# Patient Record
Sex: FEMALE | NOT HISPANIC OR LATINO | Employment: FULL TIME | URBAN - METROPOLITAN AREA
[De-identification: names, ages, dates, MRNs, and addresses within clinical notes are randomized per-mention and may not be internally consistent; named-entity substitution may affect disease eponyms.]

---

## 2022-03-05 ENCOUNTER — HOSPITAL ENCOUNTER (INPATIENT)
Facility: CLINIC | Age: 32
LOS: 3 days | Discharge: HOME HEALTH | End: 2022-03-08
Attending: OBSTETRICS & GYNECOLOGY | Admitting: OBSTETRICS & GYNECOLOGY
Payer: COMMERCIAL

## 2022-03-05 ENCOUNTER — ANESTHESIA EVENT (OUTPATIENT)
Dept: OBGYN | Facility: CLINIC | Age: 32
End: 2022-03-05
Payer: COMMERCIAL

## 2022-03-05 ENCOUNTER — ANESTHESIA (OUTPATIENT)
Dept: OBGYN | Facility: CLINIC | Age: 32
End: 2022-03-05
Payer: COMMERCIAL

## 2022-03-05 PROBLEM — Z36.89 ENCOUNTER FOR TRIAGE IN PREGNANT PATIENT: Status: ACTIVE | Noted: 2022-03-05

## 2022-03-05 LAB
ABO/RH(D): NORMAL
ANTIBODY SCREEN: NEGATIVE
BASOPHILS # BLD AUTO: 0 10E3/UL (ref 0–0.2)
BASOPHILS NFR BLD AUTO: 0 %
EOSINOPHIL # BLD AUTO: 0 10E3/UL (ref 0–0.7)
EOSINOPHIL NFR BLD AUTO: 0 %
ERYTHROCYTE [DISTWIDTH] IN BLOOD BY AUTOMATED COUNT: 13.9 % (ref 10–15)
HCT VFR BLD AUTO: 40.8 % (ref 35–47)
HGB BLD-MCNC: 13.5 G/DL (ref 11.7–15.7)
IMM GRANULOCYTES # BLD: 0.1 10E3/UL
IMM GRANULOCYTES NFR BLD: 1 %
LYMPHOCYTES # BLD AUTO: 2 10E3/UL (ref 0.8–5.3)
LYMPHOCYTES NFR BLD AUTO: 17 %
MCH RBC QN AUTO: 30.8 PG (ref 26.5–33)
MCHC RBC AUTO-ENTMCNC: 33.1 G/DL (ref 31.5–36.5)
MCV RBC AUTO: 93 FL (ref 78–100)
MONOCYTES # BLD AUTO: 0.6 10E3/UL (ref 0–1.3)
MONOCYTES NFR BLD AUTO: 5 %
NEUTROPHILS # BLD AUTO: 9.2 10E3/UL (ref 1.6–8.3)
NEUTROPHILS NFR BLD AUTO: 77 %
NRBC # BLD AUTO: 0 10E3/UL
NRBC BLD AUTO-RTO: 0 /100
PLATELET # BLD AUTO: 271 10E3/UL (ref 150–450)
RBC # BLD AUTO: 4.39 10E6/UL (ref 3.8–5.2)
SARS-COV-2 RNA RESP QL NAA+PROBE: NEGATIVE
SPECIMEN EXPIRATION DATE: NORMAL
WBC # BLD AUTO: 11.8 10E3/UL (ref 4–11)

## 2022-03-05 PROCEDURE — 250N000011 HC RX IP 250 OP 636: Performed by: ANESTHESIOLOGY

## 2022-03-05 PROCEDURE — 258N000003 HC RX IP 258 OP 636

## 2022-03-05 PROCEDURE — 250N000009 HC RX 250: Performed by: OBSTETRICS & GYNECOLOGY

## 2022-03-05 PROCEDURE — 250N000009 HC RX 250: Performed by: ANESTHESIOLOGY

## 2022-03-05 PROCEDURE — 87635 SARS-COV-2 COVID-19 AMP PRB: CPT | Performed by: OBSTETRICS & GYNECOLOGY

## 2022-03-05 PROCEDURE — 250N000011 HC RX IP 250 OP 636

## 2022-03-05 PROCEDURE — 86901 BLOOD TYPING SEROLOGIC RH(D): CPT | Performed by: OBSTETRICS & GYNECOLOGY

## 2022-03-05 PROCEDURE — 86780 TREPONEMA PALLIDUM: CPT | Performed by: OBSTETRICS & GYNECOLOGY

## 2022-03-05 PROCEDURE — 00HU33Z INSERTION OF INFUSION DEVICE INTO SPINAL CANAL, PERCUTANEOUS APPROACH: ICD-10-PCS | Performed by: ANESTHESIOLOGY

## 2022-03-05 PROCEDURE — 370N000003 HC ANESTHESIA WARD SERVICE

## 2022-03-05 PROCEDURE — 120N000001 HC R&B MED SURG/OB

## 2022-03-05 PROCEDURE — 258N000003 HC RX IP 258 OP 636: Performed by: OBSTETRICS & GYNECOLOGY

## 2022-03-05 PROCEDURE — 3E0R3BZ INTRODUCTION OF ANESTHETIC AGENT INTO SPINAL CANAL, PERCUTANEOUS APPROACH: ICD-10-PCS | Performed by: ANESTHESIOLOGY

## 2022-03-05 PROCEDURE — 85025 COMPLETE CBC W/AUTO DIFF WBC: CPT | Performed by: OBSTETRICS & GYNECOLOGY

## 2022-03-05 RX ORDER — LIDOCAINE 40 MG/G
CREAM TOPICAL
Status: DISCONTINUED | OUTPATIENT
Start: 2022-03-05 | End: 2022-03-06 | Stop reason: HOSPADM

## 2022-03-05 RX ORDER — PRENATAL VIT/IRON FUM/FOLIC AC 27MG-0.8MG
1 TABLET ORAL DAILY
COMMUNITY

## 2022-03-05 RX ORDER — CARBOPROST TROMETHAMINE 250 UG/ML
250 INJECTION, SOLUTION INTRAMUSCULAR
Status: DISCONTINUED | OUTPATIENT
Start: 2022-03-05 | End: 2022-03-06 | Stop reason: HOSPADM

## 2022-03-05 RX ORDER — METHYLERGONOVINE MALEATE 0.2 MG/ML
200 INJECTION INTRAVENOUS
Status: DISCONTINUED | OUTPATIENT
Start: 2022-03-05 | End: 2022-03-06 | Stop reason: HOSPADM

## 2022-03-05 RX ORDER — MISOPROSTOL 200 UG/1
800 TABLET ORAL
Status: DISCONTINUED | OUTPATIENT
Start: 2022-03-05 | End: 2022-03-06 | Stop reason: HOSPADM

## 2022-03-05 RX ORDER — NALOXONE HYDROCHLORIDE 0.4 MG/ML
0.4 INJECTION, SOLUTION INTRAMUSCULAR; INTRAVENOUS; SUBCUTANEOUS
Status: DISCONTINUED | OUTPATIENT
Start: 2022-03-05 | End: 2022-03-06 | Stop reason: HOSPADM

## 2022-03-05 RX ORDER — METOCLOPRAMIDE HYDROCHLORIDE 5 MG/ML
10 INJECTION INTRAMUSCULAR; INTRAVENOUS EVERY 6 HOURS PRN
Status: DISCONTINUED | OUTPATIENT
Start: 2022-03-05 | End: 2022-03-06 | Stop reason: HOSPADM

## 2022-03-05 RX ORDER — BUPIVACAINE HYDROCHLORIDE 2.5 MG/ML
INJECTION, SOLUTION EPIDURAL; INFILTRATION; INTRACAUDAL PRN
Status: DISCONTINUED | OUTPATIENT
Start: 2022-03-05 | End: 2022-03-06

## 2022-03-05 RX ORDER — MISOPROSTOL 200 UG/1
400 TABLET ORAL
Status: DISCONTINUED | OUTPATIENT
Start: 2022-03-05 | End: 2022-03-06 | Stop reason: HOSPADM

## 2022-03-05 RX ORDER — ACETAMINOPHEN 325 MG/1
650 TABLET ORAL EVERY 4 HOURS PRN
Status: DISCONTINUED | OUTPATIENT
Start: 2022-03-05 | End: 2022-03-06 | Stop reason: HOSPADM

## 2022-03-05 RX ORDER — FENTANYL CITRATE-0.9 % NACL/PF 10 MCG/ML
100 PLASTIC BAG, INJECTION (ML) INTRAVENOUS EVERY 5 MIN PRN
Status: DISCONTINUED | OUTPATIENT
Start: 2022-03-05 | End: 2022-03-06 | Stop reason: HOSPADM

## 2022-03-05 RX ORDER — FENTANYL CITRATE 50 UG/ML
50-100 INJECTION, SOLUTION INTRAMUSCULAR; INTRAVENOUS
Status: DISCONTINUED | OUTPATIENT
Start: 2022-03-05 | End: 2022-03-06 | Stop reason: HOSPADM

## 2022-03-05 RX ORDER — OXYTOCIN 10 [USP'U]/ML
10 INJECTION, SOLUTION INTRAMUSCULAR; INTRAVENOUS
Status: DISCONTINUED | OUTPATIENT
Start: 2022-03-05 | End: 2022-03-06 | Stop reason: HOSPADM

## 2022-03-05 RX ORDER — PROCHLORPERAZINE 25 MG
25 SUPPOSITORY, RECTAL RECTAL EVERY 12 HOURS PRN
Status: DISCONTINUED | OUTPATIENT
Start: 2022-03-05 | End: 2022-03-06 | Stop reason: HOSPADM

## 2022-03-05 RX ORDER — KETOROLAC TROMETHAMINE 30 MG/ML
30 INJECTION, SOLUTION INTRAMUSCULAR; INTRAVENOUS
Status: DISCONTINUED | OUTPATIENT
Start: 2022-03-05 | End: 2022-03-06

## 2022-03-05 RX ORDER — ONDANSETRON 2 MG/ML
4 INJECTION INTRAMUSCULAR; INTRAVENOUS EVERY 6 HOURS PRN
Status: DISCONTINUED | OUTPATIENT
Start: 2022-03-05 | End: 2022-03-06 | Stop reason: HOSPADM

## 2022-03-05 RX ORDER — ONDANSETRON 4 MG/1
4 TABLET, ORALLY DISINTEGRATING ORAL EVERY 6 HOURS PRN
Status: DISCONTINUED | OUTPATIENT
Start: 2022-03-05 | End: 2022-03-06 | Stop reason: HOSPADM

## 2022-03-05 RX ORDER — FENTANYL/BUPIVACAINE/NS/PF 2-1250MCG
PLASTIC BAG, INJECTION (ML) INJECTION
Status: COMPLETED
Start: 2022-03-05 | End: 2022-03-05

## 2022-03-05 RX ORDER — METOCLOPRAMIDE 10 MG/1
10 TABLET ORAL EVERY 6 HOURS PRN
Status: DISCONTINUED | OUTPATIENT
Start: 2022-03-05 | End: 2022-03-06 | Stop reason: HOSPADM

## 2022-03-05 RX ORDER — TRANEXAMIC ACID 10 MG/ML
1 INJECTION, SOLUTION INTRAVENOUS EVERY 30 MIN PRN
Status: DISCONTINUED | OUTPATIENT
Start: 2022-03-05 | End: 2022-03-06 | Stop reason: HOSPADM

## 2022-03-05 RX ORDER — IBUPROFEN 600 MG/1
600 TABLET, FILM COATED ORAL
Status: DISCONTINUED | OUTPATIENT
Start: 2022-03-05 | End: 2022-03-06

## 2022-03-05 RX ORDER — NALBUPHINE HYDROCHLORIDE 10 MG/ML
2.5-5 INJECTION, SOLUTION INTRAMUSCULAR; INTRAVENOUS; SUBCUTANEOUS EVERY 6 HOURS PRN
Status: DISCONTINUED | OUTPATIENT
Start: 2022-03-05 | End: 2022-03-06

## 2022-03-05 RX ORDER — LIDOCAINE 40 MG/G
CREAM TOPICAL
Status: DISCONTINUED | OUTPATIENT
Start: 2022-03-05 | End: 2022-03-05 | Stop reason: HOSPADM

## 2022-03-05 RX ORDER — SODIUM CHLORIDE, SODIUM LACTATE, POTASSIUM CHLORIDE, CALCIUM CHLORIDE 600; 310; 30; 20 MG/100ML; MG/100ML; MG/100ML; MG/100ML
INJECTION, SOLUTION INTRAVENOUS CONTINUOUS
Status: DISCONTINUED | OUTPATIENT
Start: 2022-03-05 | End: 2022-03-06 | Stop reason: HOSPADM

## 2022-03-05 RX ORDER — EPHEDRINE SULFATE 50 MG/ML
INJECTION, SOLUTION INTRAMUSCULAR; INTRAVENOUS; SUBCUTANEOUS
Status: DISCONTINUED
Start: 2022-03-05 | End: 2022-03-06 | Stop reason: HOSPADM

## 2022-03-05 RX ORDER — NALOXONE HYDROCHLORIDE 0.4 MG/ML
0.2 INJECTION, SOLUTION INTRAMUSCULAR; INTRAVENOUS; SUBCUTANEOUS
Status: DISCONTINUED | OUTPATIENT
Start: 2022-03-05 | End: 2022-03-06 | Stop reason: HOSPADM

## 2022-03-05 RX ORDER — OXYTOCIN 10 [USP'U]/ML
10 INJECTION, SOLUTION INTRAMUSCULAR; INTRAVENOUS
Status: DISCONTINUED | OUTPATIENT
Start: 2022-03-05 | End: 2022-03-06

## 2022-03-05 RX ORDER — OXYTOCIN/0.9 % SODIUM CHLORIDE 30/500 ML
340 PLASTIC BAG, INJECTION (ML) INTRAVENOUS CONTINUOUS PRN
Status: DISCONTINUED | OUTPATIENT
Start: 2022-03-05 | End: 2022-03-06 | Stop reason: HOSPADM

## 2022-03-05 RX ORDER — LIDOCAINE HYDROCHLORIDE AND EPINEPHRINE 15; 5 MG/ML; UG/ML
INJECTION, SOLUTION EPIDURAL PRN
Status: DISCONTINUED | OUTPATIENT
Start: 2022-03-05 | End: 2022-03-06

## 2022-03-05 RX ORDER — PROCHLORPERAZINE MALEATE 10 MG
10 TABLET ORAL EVERY 6 HOURS PRN
Status: DISCONTINUED | OUTPATIENT
Start: 2022-03-05 | End: 2022-03-06 | Stop reason: HOSPADM

## 2022-03-05 RX ORDER — OXYTOCIN/0.9 % SODIUM CHLORIDE 30/500 ML
100-340 PLASTIC BAG, INJECTION (ML) INTRAVENOUS CONTINUOUS PRN
Status: DISCONTINUED | OUTPATIENT
Start: 2022-03-05 | End: 2022-03-06

## 2022-03-05 RX ORDER — OXYTOCIN/0.9 % SODIUM CHLORIDE 30/500 ML
1-24 PLASTIC BAG, INJECTION (ML) INTRAVENOUS CONTINUOUS
Status: DISCONTINUED | OUTPATIENT
Start: 2022-03-05 | End: 2022-03-06 | Stop reason: HOSPADM

## 2022-03-05 RX ORDER — SODIUM CHLORIDE, SODIUM LACTATE, POTASSIUM CHLORIDE, CALCIUM CHLORIDE 600; 310; 30; 20 MG/100ML; MG/100ML; MG/100ML; MG/100ML
INJECTION, SOLUTION INTRAVENOUS CONTINUOUS PRN
Status: DISCONTINUED | OUTPATIENT
Start: 2022-03-05 | End: 2022-03-06 | Stop reason: HOSPADM

## 2022-03-05 RX ADMIN — BUPIVACAINE HYDROCHLORIDE 10 ML: 2.5 INJECTION, SOLUTION EPIDURAL; INFILTRATION; INTRACAUDAL at 23:20

## 2022-03-05 RX ADMIN — Medication: at 23:27

## 2022-03-05 RX ADMIN — SODIUM CHLORIDE, POTASSIUM CHLORIDE, SODIUM LACTATE AND CALCIUM CHLORIDE: 600; 310; 30; 20 INJECTION, SOLUTION INTRAVENOUS at 22:28

## 2022-03-05 RX ADMIN — LIDOCAINE HYDROCHLORIDE,EPINEPHRINE BITARTRATE 3 ML: 15; .005 INJECTION, SOLUTION EPIDURAL; INFILTRATION; INTRACAUDAL; PERINEURAL at 23:11

## 2022-03-05 RX ADMIN — LIDOCAINE HYDROCHLORIDE,EPINEPHRINE BITARTRATE 2 ML: 15; .005 INJECTION, SOLUTION EPIDURAL; INFILTRATION; INTRACAUDAL; PERINEURAL at 23:15

## 2022-03-05 RX ADMIN — Medication 2 MILLI-UNITS/MIN: at 23:27

## 2022-03-05 RX ADMIN — FENTANYL CITRATE: 0.05 INJECTION, SOLUTION INTRAMUSCULAR; INTRAVENOUS at 23:27

## 2022-03-05 ASSESSMENT — ACTIVITIES OF DAILY LIVING (ADL)
ADLS_ACUITY_SCORE: 4
ADLS_ACUITY_SCORE: 4

## 2022-03-06 LAB — T PALLIDUM AB SER QL: NONREACTIVE

## 2022-03-06 PROCEDURE — 250N000013 HC RX MED GY IP 250 OP 250 PS 637: Performed by: STUDENT IN AN ORGANIZED HEALTH CARE EDUCATION/TRAINING PROGRAM

## 2022-03-06 PROCEDURE — 120N000001 HC R&B MED SURG/OB

## 2022-03-06 PROCEDURE — 10907ZC DRAINAGE OF AMNIOTIC FLUID, THERAPEUTIC FROM PRODUCTS OF CONCEPTION, VIA NATURAL OR ARTIFICIAL OPENING: ICD-10-PCS | Performed by: OBSTETRICS & GYNECOLOGY

## 2022-03-06 PROCEDURE — 722N000001 HC LABOR CARE VAGINAL DELIVERY SINGLE

## 2022-03-06 PROCEDURE — 0KQM0ZZ REPAIR PERINEUM MUSCLE, OPEN APPROACH: ICD-10-PCS | Performed by: OBSTETRICS & GYNECOLOGY

## 2022-03-06 PROCEDURE — 258N000003 HC RX IP 258 OP 636: Performed by: OBSTETRICS & GYNECOLOGY

## 2022-03-06 PROCEDURE — G0463 HOSPITAL OUTPT CLINIC VISIT: HCPCS

## 2022-03-06 RX ORDER — METHYLERGONOVINE MALEATE 0.2 MG/ML
200 INJECTION INTRAVENOUS
Status: DISCONTINUED | OUTPATIENT
Start: 2022-03-06 | End: 2022-03-08 | Stop reason: HOSPADM

## 2022-03-06 RX ORDER — IBUPROFEN 800 MG/1
800 TABLET, FILM COATED ORAL EVERY 6 HOURS PRN
Status: DISCONTINUED | OUTPATIENT
Start: 2022-03-06 | End: 2022-03-08 | Stop reason: HOSPADM

## 2022-03-06 RX ORDER — DOCUSATE SODIUM 100 MG/1
100 CAPSULE, LIQUID FILLED ORAL DAILY
Status: DISCONTINUED | OUTPATIENT
Start: 2022-03-06 | End: 2022-03-08 | Stop reason: HOSPADM

## 2022-03-06 RX ORDER — OXYTOCIN/0.9 % SODIUM CHLORIDE 30/500 ML
340 PLASTIC BAG, INJECTION (ML) INTRAVENOUS CONTINUOUS PRN
Status: DISCONTINUED | OUTPATIENT
Start: 2022-03-06 | End: 2022-03-08 | Stop reason: HOSPADM

## 2022-03-06 RX ORDER — BISACODYL 10 MG
10 SUPPOSITORY, RECTAL RECTAL DAILY PRN
Status: DISCONTINUED | OUTPATIENT
Start: 2022-03-06 | End: 2022-03-08 | Stop reason: HOSPADM

## 2022-03-06 RX ORDER — MISOPROSTOL 200 UG/1
400 TABLET ORAL
Status: DISCONTINUED | OUTPATIENT
Start: 2022-03-06 | End: 2022-03-08 | Stop reason: HOSPADM

## 2022-03-06 RX ORDER — ACETAMINOPHEN 325 MG/1
650 TABLET ORAL EVERY 4 HOURS PRN
Status: DISCONTINUED | OUTPATIENT
Start: 2022-03-06 | End: 2022-03-08 | Stop reason: HOSPADM

## 2022-03-06 RX ORDER — HYDROCORTISONE 2.5 %
CREAM (GRAM) TOPICAL 3 TIMES DAILY PRN
Status: DISCONTINUED | OUTPATIENT
Start: 2022-03-06 | End: 2022-03-08 | Stop reason: HOSPADM

## 2022-03-06 RX ORDER — MODIFIED LANOLIN
OINTMENT (GRAM) TOPICAL
Status: DISCONTINUED | OUTPATIENT
Start: 2022-03-06 | End: 2022-03-08 | Stop reason: HOSPADM

## 2022-03-06 RX ORDER — OXYTOCIN 10 [USP'U]/ML
10 INJECTION, SOLUTION INTRAMUSCULAR; INTRAVENOUS
Status: DISCONTINUED | OUTPATIENT
Start: 2022-03-06 | End: 2022-03-08 | Stop reason: HOSPADM

## 2022-03-06 RX ORDER — CARBOPROST TROMETHAMINE 250 UG/ML
250 INJECTION, SOLUTION INTRAMUSCULAR
Status: DISCONTINUED | OUTPATIENT
Start: 2022-03-06 | End: 2022-03-08 | Stop reason: HOSPADM

## 2022-03-06 RX ORDER — MISOPROSTOL 200 UG/1
800 TABLET ORAL
Status: DISCONTINUED | OUTPATIENT
Start: 2022-03-06 | End: 2022-03-08 | Stop reason: HOSPADM

## 2022-03-06 RX ORDER — TRANEXAMIC ACID 10 MG/ML
1 INJECTION, SOLUTION INTRAVENOUS EVERY 30 MIN PRN
Status: DISCONTINUED | OUTPATIENT
Start: 2022-03-06 | End: 2022-03-08 | Stop reason: HOSPADM

## 2022-03-06 RX ADMIN — IBUPROFEN 800 MG: 800 TABLET ORAL at 08:07

## 2022-03-06 RX ADMIN — IBUPROFEN 800 MG: 800 TABLET ORAL at 14:19

## 2022-03-06 RX ADMIN — SODIUM CHLORIDE, POTASSIUM CHLORIDE, SODIUM LACTATE AND CALCIUM CHLORIDE: 600; 310; 30; 20 INJECTION, SOLUTION INTRAVENOUS at 05:05

## 2022-03-06 RX ADMIN — ACETAMINOPHEN 650 MG: 325 TABLET, FILM COATED ORAL at 11:24

## 2022-03-06 RX ADMIN — IBUPROFEN 800 MG: 800 TABLET ORAL at 20:02

## 2022-03-06 RX ADMIN — MISOPROSTOL 400 MCG: 200 TABLET ORAL at 07:27

## 2022-03-06 RX ADMIN — DOCUSATE SODIUM 100 MG: 100 CAPSULE, LIQUID FILLED ORAL at 11:24

## 2022-03-06 RX ADMIN — ACETAMINOPHEN 650 MG: 325 TABLET, FILM COATED ORAL at 16:00

## 2022-03-06 ASSESSMENT — ACTIVITIES OF DAILY LIVING (ADL)
ADLS_ACUITY_SCORE: 10
ADLS_ACUITY_SCORE: 8
ADLS_ACUITY_SCORE: 10
ADLS_ACUITY_SCORE: 4
ADLS_ACUITY_SCORE: 8
ADLS_ACUITY_SCORE: 4
ADLS_ACUITY_SCORE: 8
ADLS_ACUITY_SCORE: 10
ADLS_ACUITY_SCORE: 8
ADLS_ACUITY_SCORE: 8
ADLS_ACUITY_SCORE: 10
ADLS_ACUITY_SCORE: 8
ADLS_ACUITY_SCORE: 10
ADLS_ACUITY_SCORE: 8
ADLS_ACUITY_SCORE: 4
ADLS_ACUITY_SCORE: 10
ADLS_ACUITY_SCORE: 4
ADLS_ACUITY_SCORE: 8
ADLS_ACUITY_SCORE: 8
ADLS_ACUITY_SCORE: 4
ADLS_ACUITY_SCORE: 10
ADLS_ACUITY_SCORE: 10

## 2022-03-06 NOTE — PLAN OF CARE
Data: Conchis Metzger transferred to 430 via wheelchair at 0925. Baby transferred via parent's arms.  Patients mobililty level scored using the bedside mobility assistance tool (BMAT). Patient is at a mobility level test number: 4. Mobility equipment used: none required. Required assist of 1 staff members. Further use of BMAT scoring required.  Action: Receiving unit notified of transfer: Yes. Patient and family notified of room change. Report given to Joan at 0930. Belongings sent to receiving unit. Accompanied by Registered Nurse. Oriented patient to surroundings. Call light within reach. ID bands double-checked with receiving RN.  Response: Patient tolerated transfer and is stable.

## 2022-03-06 NOTE — PROVIDER NOTIFICATION
03/06/22 0305   Provider Notification   Provider Name/Title Dr. Vann    Method of Notification In Department   Request Evaluate - Remote   Notification Reason Variability Change;Bleeding     Fetal heart rate reviewed. See flow sheet for details. Bleeding discussed. No fresh blood currently on pad; however, has clot at the introitus. She will go to bedside shortly to assess and preform AROM if appropriate. Shireen Alvarez RN on 3/6/2022 at 3:54 AM

## 2022-03-06 NOTE — ANESTHESIA PROCEDURE NOTES
Epidural catheter Procedure Note    Pre-Procedure   Staff -        Anesthesiologist:  Pierce Bellamy MD       Performed By: anesthesiologist       Referred By: anabelle       Location: OB       Pre-Anesthestic Checklist: patient identified, IV checked, risks and benefits discussed, informed consent, monitors and equipment checked, pre-op evaluation and at physician/surgeon's request  Timeout:       Correct Patient: Yes        Correct Procedure: Yes        Correct Site: Yes        Correct Position: Yes   Procedure Documentation  Procedure: epidural catheter       Diagnosis: labor       Patient Position: sitting       Patient Prep/Sterile Barriers: sterile gloves, mask, patient draped       Skin prep: Betadine       Local skin infiltrated with 3 mL of 1% lidocaine.        Insertion Site: L3-4. (midline approach).       Technique: LORT saline        IDANIA at 6 cm.       Needle Type: [employer]y needle       Needle Gauge: 17.        Needle Length (Inches): 3.5        Catheter: 19 G.         Catheter threaded easily.         Threaded 11 cm at skin.         # of attempts: 1 and  # of redirects:  0    Assessment/Narrative         Paresthesias: No.      Test dose of 3 mL lidocaine 1.5% w/ 1:200,000 epinephrine at.         Test dose negative, 3 minutes after injection, for signs of intravascular, subdural, or intrathecal injection.       Insertion/Infusion Method: LORT saline       Aspiration negative for Heme or CSF via Epidural Catheter.     Comments:  Procedure tolerated well without apparent complications. Initial MDA bolus of 0.25% bupivacaine given. Epidural infusion (0.125% bupi with fentanyl 2mcg/ml) started at 10 ml/hr with PCEA of 5 ml q15min with a max cumulative dose of 25 ml/hr. PCEA instructions given and use encouraged PRN. Epidural expectations again reviewed and questions answered. Patient hemodynamically stable.  Patient and epidural functionality to be reassessed later via vital sign flowsheet, nursing  communication, and/or patient report.  Anesthesiologist immediately available for ongoing assessment and management.

## 2022-03-06 NOTE — PROVIDER NOTIFICATION
03/05/22 2101   Provider Notification   Provider Name/Title Dr. Vann    Method of Notification Phone   Request Evaluate - Remote   Notification Reason Labor Status     Unable to run ROM+ or FERN d/t blood. Perineum remains most, but there is no fluid on the pad. Frequency of contractions have increased. Cervix has slightly changed since arriving, but significantly changed since her office visit on 03/02/2022. Orders received to admit to labor and delivery. Telephone orders read back and verified. Will inform pt of plan of care. Shireen Alvarez RN on 3/5/2022 at 9:04 PM

## 2022-03-06 NOTE — PLAN OF CARE
Pt transferred to labor rm 409 via ambulation accompanied by spouse and RN. Pt tolerated the transfer well. Shireen Alvarez RN on 3/6/2022 at 1:30 AM

## 2022-03-06 NOTE — PROVIDER NOTIFICATION
03/06/22 0021   Provider Notification   Provider Name/Title Dr. Vann    Method of Notification Phone   Request Evaluate - Remote   Notification Reason Labor Status     Physician updated on maternal and fetal condition. Orders received to check cervix around 0130. Telephone orders read back and verified. Will inform pt of plan of care. Shireen Alvarez RN on 3/6/2022 at 12:32 AM

## 2022-03-06 NOTE — H&P
[facility] Labor and Delivery History and Physical    Conchis Metzger MRN# [id number]   Age: 31 year old YOB: 1990     Date of Admission:  3/5/2022         HPI:   Conchis Metzger is a 31 year old  at 39w2d by LMP c/w 11w1d US    admitted for contractions and questionable LOF.  The patient reports irregular painful contractions in the AM.  Around 1100, she went to the bathroom and she has some think mucous in the toilet.  She put on a thin pad and there was never anything on it until she used the bathroom, until 1600 when she felt more of a gush that soaked her pad.  Since that, more clear mucous discharge and around 1700 had onset of more painful contractions.     Admission SVE was 3-4 cm which was a change from the office on 3-2 when she was closed.  A recheck 1.5 hours later showed change to 4 cm and she was admitted for labor.  The patient reports Good fetal movement. Comfortable with epidural.           Pregnancy history:     OBSTETRIC HISTORY:  OB History    Para Term  AB Living   2 0 0 0 1 0   SAB IAB Ectopic Multiple Live Births   1 0 0 0 0      # Outcome Date GA Lbr James/2nd Weight Sex Delivery Anes PTL Lv   2 Current            1 SAB 2021 6w0d              EDC: Estimated Date of Delivery: Mar 11, 2022    Prenatal Labs:   Lab Results   Component Value Date    AS Negative 2022    HGB 13.5 2022           Maternal Past Medical History:     Past Medical History:   Diagnosis Date     Gastroesophageal reflux disease with esophagitis      Past Surgical History:   Procedure Laterality Date     DENTAL SURGERY      [place] teeth removed       Medications Prior to Admission   Medication Sig Dispense Refill Last Dose     Prenatal Vit-Fe Fumarate-FA (PRENATAL MULTIVITAMIN W/IRON) 27-0.8 MG tablet Take 1 tablet by mouth daily   3/5/2022 at 0800           Social History:     Social History     Tobacco Use     Smoking status: Never Smoker     Smokeless tobacco: Never  Used   Substance Use Topics     Alcohol use: Not Currently            Review of Systems:   The Review of Systems is negative other than noted in the HPI          Physical Exam:     Patient Vitals for the past 8 hrs:   BP Temp Temp src Resp   22 0205 -- 98.4  F (36.9  C) Oral --   22 0115 -- 98.4  F (36.9  C) Oral --   22 0000 -- 99  F (37.2  C) Oral --   22 2300 -- 98.4  F (36.9  C) Oral --   22 2220 -- 98.8  F (37.1  C) Oral --   22 1935 124/81 98  F (36.7  C) Oral 24     Gen: Pleasant, NAD   CV:  Regular rate and rhythm, no murmurs, rubs or gallops appreciated   Resp: Non-labored breathing.  Lungs clear to ausculation bilaterally   Abd: Soft, non-tender and non-distended   Ext: Trace pedal edema bilaterally     Cervix: 7.5/90%/-2 following rupture of forebag, clear.  Membranes: Assuming ROM at 1130 on 3/5 (fern and Rom Plus not sent per RN given amount of bloody discharge)  EFW: 7.5 lbs.  Presentation:Cephalic    Fetal Heart Rate Tracing:   Baseline 120  Variability: Moderate  Accelerations: Present  Decelerations: None  Interpretation: reactive    Contractions: q 2-3 min per EFM        Assessment:   Conchis Metzger is a 31 year old  at 39w2d admitted for PROM in latent labor.        Plan:     Labor:   - After minimal SVE change and concern for prolonged rupture, pitocin augmentation was started at 2327, currently at 3 mu/min. Continue to titrate as able.   Pain: Comfortable with epidural    FWB:   - Continous EFM   - Category I FHT, reactive    Prenatal Care:  -OB labs: B positive, Rubella immune, HIV neg, Heb B nonreactive, Heb RPR negative  -Genetics: NIPS normal, declines AFP   - Anatomy ultrasound normal, anterior placenta  - Rh positive, Rhogam not indicated  - , Hgb 11.8, plt 312 at 28 weeks  - S/p flu, Tdap and COVID and booster  - GBS neg  - Feed: Breast; has pump  - Contraception: OCP vs POP    Arianna Boo MD   Pager: [phone]   2022, 3:06  AM

## 2022-03-06 NOTE — PROVIDER NOTIFICATION
03/05/22 2236   Provider Notification   Provider Name/Title Dr. Vann    Method of Notification Phone   Request Evaluate - Remote   Notification Reason Labor Status     No significant change from earlier. If going by pt's report, water broke at 1100. Plan to start pitocin at 2300 or shortly after. Telephone orders read back and verified. Will inform pt of plan of care. Shireen Alvarez RN on 3/5/2022 at 10:38 PM

## 2022-03-06 NOTE — PROVIDER NOTIFICATION
03/06/22 0223   Provider Notification   Provider Name/Title Dr. Vann    Method of Notification Phone   Request Evaluate - Remote   Notification Reason Labor Status     Physician updated on maternal and fetal condition. Pt has more bleeding than expected and a bulging bag of fluid. Fetal heart rate is currently within normal limits with moderate variability and accelerations. Physician will come to beside around 0300 to evaluate in person and preform AROM if appropriate. Will inform pt of plan of care. Shireen Alvarez RN on 3/6/2022 at 3:45 AM

## 2022-03-06 NOTE — L&D DELIVERY NOTE
Delivery Summary    Conchis Metzger MRN# [id number]   Age: 31 year old YOB: 1990     Conchis Metzger is a 31 year old  at 39w2d by LMP c/w 11w1d US who was admitted for latent labor and possible SROM on 3/5/2022. Pregnancy was uncomplicated. Upon admission her cervix was 3.5/90/-2. She received an epidural with good pain relief. AROM was performed at 0320 with clear fluid. Pitocin was started for augmentation. She was complete at 0545 and began pushing at 0615. At 0706 on 3/6/22 a vigorous male infant was delivered in the JOSÉ position with apgars of 9 and 9. Weight 3450g. Delayed cord clamping was done for >60 seconds. The placenta delivered spontaneously with manual assistance distal to the cervix,  Inspection showed it was intact with a 3 vessel cord. Pitocin was given after delivery of the placenta followed by a large gush of bleeding.  At that time, fundal massage and buccal misoprostol was given.  A 2nd degree perineal laceration was present and was repaired in the standard fashion with 3-0 Vicryl. EBL of 850mL. Sponge and sharp counts were correct. At this time, Dr. Blackwell took over cares and to monitoring bleeding closely.       Shira Metzger-Conchis [[id number]]    Labor Event Times    Labor onset date: 3/5/22 Onset time:  9:00 PM   Dilation complete date: 3/6/22 Complete time:  5:45 AM   Start pushing date/time: 3/6/2022 0615      Labor Events     labor?: No   steroids: None  Labor Type: Spontaneous, Augmentation  Predominate monitoring during 1st stage: continuous electronic fetal monitoring     Antibiotics received during labor?: No     Rupture identifier: Sac 1  Rupture date/time: 3/5/22 1600   Rupture type: Spontaneous rupture of membranes occuring during spontaneous labor or augmentation  Fluid color: Clear  Fluid odor: Normal     Augmentation: Oxytocin  Indications for augmentation: Ineffective Contraction Pattern  1:1 continuous labor support provided by?: RN  Labor partogram used?: no      Delivery/Placenta Date and Time    Delivery Date: 3/6/22 Delivery Time:  7:06 AM   Placenta Date/Time: 3/6/2022  7:25 AM  Oxytocin given at the time of delivery: after delivery of baby  Delivering clinician: Arianna Vann MD   Other personnel present at delivery:  Provider Role   Shireen Alvarez RN Delivery Nurse   Arianna Vann MD Obstetrician         Vaginal Counts     Initial count performed by 2 team members:  Two Team Members   Shireen GARCIA, GARY Vann        Needles Suture Needles Sponges (RETIRED) Instruments   Initial counts 2  5    Added to count  1     Relief counts       Final counts 2 1 5          Placed during labor Accounted for at the end of labor   FSE No NA   IUPC No NA   Cervadil No NA              Final count performed by 2 team members:  Two Team Members   Dr Rishi Deleon RN      Final count correct?: Yes     Apgars    Living status: Living   1 Minute 5 Minute 10 Minute 15 Minute 20 Minute   Skin color: 1  1       Heart rate: 2  2       Reflex irritability: 2  2       Muscle tone: 2  2       Respiratory effort: 2  2       Total: 9  9       Apgars assigned by: SHIREEN VEGA     Cord    Vessels: 3 Vessels    Cord Complications: Nuchal   Nuchal Intervention: delivered through         Nuchal cord description: loose nuchal cord         Cord Blood Disposition: Lab    Gases Sent?: No    Delayed cord clamping?: Yes    Cord Clamping Delay (seconds): 31-60 seconds    Stem cell collection?: No        Resuscitation    Methods: None      Measurements    Weight: 7 lb 9.7 oz       Labor Events and Shoulder Dystocia    Fetal Tracing Prior to Delivery: Category 2     Delivery (Maternal) (Provider to Complete) ([id number])    Episiotomy: None  Perineal lacerations: 2nd Repaired?: Yes   Labial laceration: bilateral Repaired?: Yes   Repair suture: 3-0 Vicryl  Genital tract inspection done: Pos     Blood Loss  Mother: Nancy Metzgerherb Newman #[id number]    Start of Mother's Information    Delivery Blood Loss  03/05/22 2100 - 03/06/22 0733    None           End of Mother's Information  Mother: Conchis Metzger #[id number]          Delivery - Provider to Complete ([id number])    Delivering clinician: Arianna Vann MD  Attempted Delivery Types (Choose all that apply): Spontaneous Vaginal Delivery  Delivery Type (Choose the 1 that will go to the Birth History): Vaginal, Spontaneous                   Other personnel:  Provider Role   Shireen Alvarez, RN Delivery Nurse   Arianna Vann MD Obstetrician                Placenta    Date/Time: 3/6/2022  7:25 AM  Removal: Expressed, Manual Removal  Disposition: Hospital disposal           Anesthesia    Method: Epidural  Cervical dilation at placement: 4-7     Analgesic:  BIRTH HISTORY: ANALGESIC   FENT 2 MCG/ML-BUPIV 0.125% (USE BUPIV 0.5%) IN  ML CNR-HI             Presentation and Position    Presentation: Vertex    Position: Left Occiput Anterior                 Arianna Vann MD

## 2022-03-06 NOTE — ANESTHESIA PREPROCEDURE EVALUATION
Anesthesia Pre-Procedure Evaluation    Patient: Conchis Metzger   MRN: [id number] : 1990        Procedure : * No procedures listed *          History reviewed. No pertinent past medical history.   Past Surgical History:   Procedure Laterality Date     DENTAL SURGERY      [place] teeth removed       No Known Allergies   Social History     Tobacco Use     Smoking status: Never Smoker     Smokeless tobacco: Never Used   Substance Use Topics     Alcohol use: Not Currently      Wt Readings from Last 1 Encounters:   No data found for Wt        Anesthesia Evaluation            ROS/MED HX  ENT/Pulmonary:  - neg pulmonary ROS     Neurologic:  - neg neurologic ROS     Cardiovascular:  - neg cardiovascular ROS     METS/Exercise Tolerance:     Hematologic:  - neg hematologic  ROS     Musculoskeletal:  - neg musculoskeletal ROS     GI/Hepatic:  - neg GI/hepatic ROS     Renal/Genitourinary:  - neg Renal ROS     Endo:  - neg endo ROS     Psychiatric/Substance Use:  - neg psychiatric ROS     Infectious Disease:  - neg infectious disease ROS     Malignancy:       Other:            Physical Exam    Airway        Mallampati: II   TM distance: > 3 FB   Neck ROM: full   Mouth opening: > 3 cm    Respiratory Devices and Support         Dental  no notable dental history         Cardiovascular          Rhythm and rate: regular and normal     Pulmonary   pulmonary exam normal                OUTSIDE LABS:  CBC:   Lab Results   Component Value Date    WBC 11.8 (H) 2022    HGB 13.5 2022    HCT 40.8 2022     2022     BMP: No results found for: NA, POTASSIUM, CHLORIDE, CO2, BUN, CR, GLC  COAGS: No results found for: PTT, INR, FIBR  POC: No results found for: BGM, HCG, HCGS  HEPATIC: No results found for: ALBUMIN, PROTTOTAL, ALT, AST, GGT, ALKPHOS, BILITOTAL, BILIDIRECT, ALEXIS  OTHER: No results found for: PH, LACT, A1C, RAÚL, PHOS, MAG, LIPASE, AMYLASE, TSH, T4, T3, CRP, SED    Anesthesia Plan    ASA Status:   2   - Procedure: Procedure only, no anesthetic delivered      Anesthesia Type: Epidural.              Consents    Anesthesia Plan(s) and associated risks, benefits, and realistic alternatives discussed. Questions answered and patient/representative(s) expressed understanding.    - Discussed:     - Discussed with:  Patient         Postoperative Care            Comments:    Other Comments: Continuous Labor Epidural: Indication is for labor pain. Following an discussion of the procedure, epidural expectations, and risks and benefits (risks including, but not limited to, nerve damage, infection, bleeding/hematoma, inadvertent dural puncture, spinal headache, partial or failed block), the patient appears to understand and consents to proceed. Questions were encouraged and answered.             Pierce Bellamy MD

## 2022-03-06 NOTE — CARE PLAN
"Data: Patient presented to [facility]: 3/5/2022  7:19 PM. Reason for maternal/fetal assessment is uterine contractions, leaking vaginal fluid. Patient reports she first noticed contractions at 0700. First noticed a \"glop\" of clear mucous at 1100. The contractions continued to get stronger and closer together throughout the day. The clear mucous continued to leak throughout the day too; however, didn't notice a lot on her pad. Patient is a .  Prenatal record reviewed. Pregnancy has been uncomplicated..  Gestational Age 39w1d. VSS. Fetal movement active. Patient denies abdominal pain, pelvic pressure, nausea, vomiting, headache, visual disturbances, epigastric or URQ pain, significant edema. Support person is present.   Action: Verbal consent for EFM. Triage assessment completed. Bill of rights reviewed.  Response: Patient verbalized agreement with plan. Will contact Dr Arianna Vann with update and for further orders. Shireen Alvarez RN on 3/5/2022 at 8:39 PM    "

## 2022-03-06 NOTE — PROVIDER NOTIFICATION
03/05/22 2000   Provider Notification   Provider Name/Title Dr. Vann    Method of Notification In Department   Request Evaluate-Remote   Notification Reason Other  (Pt arrived )     Physician informed of pt's arrival and current maternal and fetal condition. Orders received to collect a ROM+ and FERN. Verbal orders read back and verified. Will inform pt of plan of care. Shireen Alvarez RN on 3/5/2022 at 8:42 PM

## 2022-03-06 NOTE — PROVIDER NOTIFICATION
03/06/22 0300   Provider Notification   Provider Name/Title Dr. Vann    Method of Notification Phone   Request Evaluate in Person   Notification Reason Variability Change;Bleeding     Physician requested to bedside to evaluate fetal heart rate, SVE, bleeding, and to preform AROM if appropriate. She will be up shortly. Shireen Alvarez RN on 3/6/2022 at 3:36 AM

## 2022-03-07 LAB — HGB BLD-MCNC: 10.4 G/DL (ref 11.7–15.7)

## 2022-03-07 PROCEDURE — 999N000081 HC STATISTIC IP LACTATION SERVICES 31-45 MIN

## 2022-03-07 PROCEDURE — 36415 COLL VENOUS BLD VENIPUNCTURE: CPT | Performed by: STUDENT IN AN ORGANIZED HEALTH CARE EDUCATION/TRAINING PROGRAM

## 2022-03-07 PROCEDURE — 85018 HEMOGLOBIN: CPT | Performed by: STUDENT IN AN ORGANIZED HEALTH CARE EDUCATION/TRAINING PROGRAM

## 2022-03-07 PROCEDURE — 250N000013 HC RX MED GY IP 250 OP 250 PS 637: Performed by: OBSTETRICS & GYNECOLOGY

## 2022-03-07 PROCEDURE — 250N000013 HC RX MED GY IP 250 OP 250 PS 637: Performed by: STUDENT IN AN ORGANIZED HEALTH CARE EDUCATION/TRAINING PROGRAM

## 2022-03-07 PROCEDURE — 120N000001 HC R&B MED SURG/OB

## 2022-03-07 RX ORDER — IBUPROFEN 800 MG/1
800 TABLET, FILM COATED ORAL EVERY 6 HOURS PRN
Status: ON HOLD | COMMUNITY
Start: 2022-03-07 | End: 2024-07-26

## 2022-03-07 RX ORDER — PRENATAL VIT/IRON FUM/FOLIC AC 27MG-0.8MG
1 TABLET ORAL DAILY
Status: DISCONTINUED | OUTPATIENT
Start: 2022-03-07 | End: 2022-03-08 | Stop reason: HOSPADM

## 2022-03-07 RX ORDER — ACETAMINOPHEN 325 MG/1
650 TABLET ORAL EVERY 4 HOURS PRN
Status: ON HOLD | COMMUNITY
Start: 2022-03-07 | End: 2024-07-27

## 2022-03-07 RX ADMIN — IBUPROFEN 800 MG: 800 TABLET ORAL at 13:00

## 2022-03-07 RX ADMIN — IBUPROFEN 800 MG: 800 TABLET ORAL at 19:33

## 2022-03-07 RX ADMIN — DOCUSATE SODIUM 100 MG: 100 CAPSULE, LIQUID FILLED ORAL at 08:34

## 2022-03-07 RX ADMIN — ACETAMINOPHEN 650 MG: 325 TABLET, FILM COATED ORAL at 17:22

## 2022-03-07 RX ADMIN — ACETAMINOPHEN 650 MG: 325 TABLET, FILM COATED ORAL at 00:34

## 2022-03-07 RX ADMIN — PRENATAL VITAMINS-IRON FUMARATE 27 MG IRON-FOLIC ACID 0.8 MG TABLET 1 TABLET: at 11:42

## 2022-03-07 RX ADMIN — IBUPROFEN 800 MG: 800 TABLET ORAL at 02:07

## 2022-03-07 RX ADMIN — ACETAMINOPHEN 650 MG: 325 TABLET, FILM COATED ORAL at 08:34

## 2022-03-07 ASSESSMENT — ACTIVITIES OF DAILY LIVING (ADL)
ADLS_ACUITY_SCORE: 8

## 2022-03-07 NOTE — PROGRESS NOTES
Has been independent with breast feeding. Pain expressed when infant latched on. She is not using the nipple shield. Had a golf ball sized clot. FF@U. Uterine cramping rated 3 and feels Tylenol and Motrin giving adequate relief. Declined cold pack to Laceration and prefers Tucks. Voiding without difficulty. at bedside and is a good support. Encourage ambulation. Encourage PO fluids. Monitor Lochia. Medicate for pain.

## 2022-03-07 NOTE — PLAN OF CARE
Vital signs stable. Postpartum assessment WDL. Pain well controlled. Up ad/rebecca. Voiding w/o difficulty. Breastfeeding with a nipple shield. Pt reports her nipples feel tender especially when breastfeeding. Mother Love nipple cream, given. Bonding well with . Will continue to monitor. Questions/concerns addressed.

## 2022-03-07 NOTE — DISCHARGE SUMMARY
[facility]    Discharge Summary  Obstetrics    Date of Admission:  3/5/2022  Date of Discharge:  3/8/2022  Discharging Provider: Dr. Jovan Diaz  [phone]       Discharge Diagnoses   Patient Active Problem List   Diagnosis     Encounter for triage in pregnant patient     Labor and delivery indication for care or intervention     Vaginal delivery     History of Present Illness   Conchis Metzger is a 31 year old female now  who presented to L&D @ 39w2d contractions and possible SROM. Her pregnancy has been uncomplicated. Please see her admit H&P for full details of her PMH, PSH, Meds, Allergies and exam on admit.    Hospital Course   The patient had a Normal spontaneous vaginal delivery @ 39w2d with  (s/p pitocin and Cytotec) please see her delivery summary for full details.     Her postpartum course was uncomplicated. On postpartum day 2, she was meeting all of her postpartum goals and deemed stable for discharge. She was voiding without difficulty, tolerating a regular diet without nausea and vomiting, her pain was well controlled on oral pain medicines and her lochia was appropriate.    Hgb:   Lab Results   Component Value Date    HGB 10.4 2022    HGB 13.5 2022       No results found for: RH and rhogam was not given    Contraception was discussed and will be addressed at her postpartum appointment.    Instructions:  1) Call for temperature greater than 100.4F, foul smelling vaginal discharge, bleeding more than 1 pad per hour for 2 hrs, pain not controlled by oral pain meds, severe constipation or severe nausea or vomiting.  2) She was instructed to follow-up with her primary OB in 6 weeks for a routine postpartum visit  3) She was instructed to continue her PNV on discharge if she wished to breast feed her infant.    Dr. Jovan Diaz  [phone]     Discharge Disposition   Discharged to home   Condition at discharge: Stable    Primary Care Physician    Physician No Ref-Primary    Consultations This Hospital Stay   ANESTHESIOLOGY IP CONSULT  HOME CARE POST PARTUM/ IP CONSULT  LACTATION IP CONSULT    Discharge Orders      Postpartum Home Care Referral      Activity    Activity as tolerated     Reason for your hospital stay    Maternity care     Follow Up    Follow up with provider in 6 weeks for post-delivery check     Breast pump - Manual/Electric    Breast Pump Documentation:  Manual/Electric Pump: To support adequate breast milk production and nutrition for infant.     I, the undersigned, certify that the above prescribed supplies are medically necessary for this patient and is both reasonable and necessary in reference to accepted standards of medical and necessary in reference to accepted standards of medical practice in the treatment of this patient's condition and is not prescribed as a convenience.     Diet    Resume previous diet     Discharge Medications   Current Discharge Medication List      START taking these medications    Details   acetaminophen (TYLENOL) 325 MG tablet Take 2 tablets (650 mg) by mouth every 4 hours as needed for mild pain or fever (greater than or equal to 38  C /100.4  F (oral) or 38.5  C/ 101.4  F (core).)    Associated Diagnoses:  (spontaneous vaginal delivery)      ibuprofen (ADVIL/MOTRIN) 800 MG tablet Take 1 tablet (800 mg) by mouth every 6 hours as needed for other (cramping)    Associated Diagnoses:  (spontaneous vaginal delivery)         CONTINUE these medications which have NOT CHANGED    Details   Prenatal Vit-Fe Fumarate-FA (PRENATAL MULTIVITAMIN W/IRON) 27-0.8 MG tablet Take 1 tablet by mouth daily           Allergies   No Known Allergies

## 2022-03-07 NOTE — PLAN OF CARE
Pt is up ad rebecca, and reports adequate pain control. Family is present and supportive. Pt is attentive to infants needs. Meeting expected goals.

## 2022-03-07 NOTE — PROGRESS NOTES
[facility] Postpartum Note    S:  Conchis Metzger feels sore this morning. Was able to sleep last night. Pain control adequate. Lochia minimal. Voiding. Breast feeding. Mood Good.     O:  Vitals were reviewed  Blood pressure 117/75, pulse 74, temperature 98  F (36.7  C), temperature source Oral, resp. rate 16, SpO2 95 %, unknown if currently breastfeeding.      General: healthy, alert and no distress  Abd: soft, appropriately tender, fundus firm  Legs: Non-tender, 0+ pitting edema    Hemoglobin   Date Value Ref Range Status   03/07/2022 10.4 (L) 11.7 - 15.7 g/dL Final   03/05/2022 13.5 11.7 - 15.7 g/dL Final       Assessment and Plan:   Postpartum Day #1, status post vaginal delivery, doing well.  -- Routine care  --: s/p pitocin and Cytotec. hgb 13.5 -> 10.4   -- Discharge meds: OTC tylenol and ibuprofen   -- Contraception: pills    Mandie Perry, , DO

## 2022-03-07 NOTE — PLAN OF CARE
VSS, taking ibuprofen and tylenol for pain, breast feeding independently, seen by lactation RN this shift; completed discharge paperwork; will be discharging tomorrow.

## 2022-03-08 VITALS
HEART RATE: 77 BPM | RESPIRATION RATE: 16 BRPM | SYSTOLIC BLOOD PRESSURE: 129 MMHG | OXYGEN SATURATION: 95 % | DIASTOLIC BLOOD PRESSURE: 77 MMHG | TEMPERATURE: 99.5 F

## 2022-03-08 PROCEDURE — 250N000013 HC RX MED GY IP 250 OP 250 PS 637: Performed by: OBSTETRICS & GYNECOLOGY

## 2022-03-08 PROCEDURE — 250N000013 HC RX MED GY IP 250 OP 250 PS 637: Performed by: STUDENT IN AN ORGANIZED HEALTH CARE EDUCATION/TRAINING PROGRAM

## 2022-03-08 RX ADMIN — IBUPROFEN 800 MG: 800 TABLET ORAL at 08:16

## 2022-03-08 RX ADMIN — PRENATAL VITAMINS-IRON FUMARATE 27 MG IRON-FOLIC ACID 0.8 MG TABLET 1 TABLET: at 08:16

## 2022-03-08 RX ADMIN — DOCUSATE SODIUM 100 MG: 100 CAPSULE, LIQUID FILLED ORAL at 08:16

## 2022-03-08 ASSESSMENT — ACTIVITIES OF DAILY LIVING (ADL)
ADLS_ACUITY_SCORE: 8

## 2022-03-08 NOTE — PLAN OF CARE
VSS, on pathway, using gel pads and mother love cream for sore nipples; discharge education completed and follow up discussed with pt in 6 weeks for OB PP visit with MD; no further questions, discharging with baby this afternoon.

## 2022-03-08 NOTE — PLAN OF CARE
Goals met: vitally stable, fundal checks/lochia, voiding, pain control (did not take medications overnight), tolerating diet, ambulating independently, breastfeeding Q2-3hr (was cluster feeding in evening), bonding/cares for baby    Work in progress: laceration pain (has tucks), sore nipples (has motherlove/hydrogels, is no longer using nipple shield), interested in discharge today

## 2022-03-08 NOTE — LACTATION NOTE
Lactation visit. Assisted with feeds this shift. Baby has been sleepy. Jaundice high intermediate and baby had circumcision procedure this morning. Patient has been using a shield prn. Educated on sleepiness with  jaundice and circ. Baby latched with shield, writer compressed EBM. Baby needing stimulation to keep nursing. Switched from football hold to cross cradle for a better latch. Second feed, baby mor interested latch right away without shield. Encouraged breast compressions. Conchis was independent with latch. Basic breastfeeding education reviewed, knows to breastfeed every 2-3 hours. Started patient pumping to help bring milk in. Knows to call for assistance prn

## 2022-03-08 NOTE — PROGRESS NOTES
[facility]  PPD# 2    Pt doing well, states she would like to go home today. Ambulating, voiding, tolerating PO. Decreased lochia. Pain controlled. Breast feeding and coping well with infant.    Vitals:    22 2300 22 0800 22 1721 22 0136   BP: 117/72 117/75 118/75 112/63   BP Location: Left arm Left arm  Left arm   Patient Position: Left side Sitting  Semi-Courtney's   Cuff Size: Adult Regular   Adult Regular   Pulse: 85 74 72 68   Resp: 18 16 18 18   Temp: 98.1  F (36.7  C) 98  F (36.7  C) 98.8  F (37.1  C) 98.3  F (36.8  C)   TempSrc: Oral Oral Oral Oral   SpO2:         Abd soft, appropriately tender, nondistended, FFBU, no fundal tenderness  Ext 1+ edema bilat LE, no CT BL    Lab Results   Component Value Date    HGB 10.4 2022       A/P 31 year old  at 39w2d s/p  PPD# 2..    -Hemodynamically stable    - : s/p pitocin and Cytotec. hgb 13.5 -> 10.4    - ABLA- asymptomatic.    - Continue with prenatal vitamins, iron rich diet, vit C  -Rh pos  -Diet; regular  -pain Tylenol and Motrin  -Bowel ppx- Senna/docusate/simethicone  -Rubella immune  -Tdap given prentally  -Breast feeding, encouraged. Continue PNV's. Proper hydration and caloric intake counseled.  -BC; pills    Plan: Discharge home, 6 wk PP visit. Continue PNVs.    Dr. Jovan Diaz  [phone]  3/8/2022 7:16 AM

## 2022-03-08 NOTE — PLAN OF CARE
Data: Vital signs within normal limits. Postpartum checks within normal limits - see flow record. Patient eating and drinking normally. Patient able to empty bladder independently and is up ambulating. Patient performing self cares, is able to care for infant and is breastfeeding every 2-3 hours. Lactation visit this evening, encouraged pumping after each feed.   Perineum  has some swelling noted. Is using tucks pads for discomfort.    Action: Patient medicated during the shift for pain with tylenol and ibuprofen. See MAR. Patient education done, see flow record.  Response: Positive attachment behaviors observed with infant. Patient's spouse and MIL present this shift.   Plan: Continue current plan of care.  Anticipate discharge on 3/8.

## 2024-07-26 ENCOUNTER — HOSPITAL ENCOUNTER (INPATIENT)
Facility: CLINIC | Age: 34
LOS: 1 days | Discharge: HOME HEALTH | End: 2024-07-28
Attending: OBSTETRICS & GYNECOLOGY | Admitting: OBSTETRICS & GYNECOLOGY
Payer: COMMERCIAL

## 2024-07-26 PROCEDURE — G0463 HOSPITAL OUTPT CLINIC VISIT: HCPCS

## 2024-07-26 RX ORDER — CALCIUM CARBONATE 500 MG/1
2 TABLET, CHEWABLE ORAL 2 TIMES DAILY
COMMUNITY

## 2024-07-26 ASSESSMENT — ACTIVITIES OF DAILY LIVING (ADL): ADLS_ACUITY_SCORE: 18

## 2024-07-27 ENCOUNTER — ANESTHESIA (OUTPATIENT)
Dept: OBGYN | Facility: CLINIC | Age: 34
End: 2024-07-27
Payer: COMMERCIAL

## 2024-07-27 ENCOUNTER — ANESTHESIA EVENT (OUTPATIENT)
Dept: OBGYN | Facility: CLINIC | Age: 34
End: 2024-07-27
Payer: COMMERCIAL

## 2024-07-27 LAB
ABO/RH(D): NORMAL
ALBUMIN MFR UR ELPH: 92.2 MG/DL
ALT SERPL W P-5'-P-CCNC: 12 U/L (ref 0–50)
ANTIBODY SCREEN: NEGATIVE
AST SERPL W P-5'-P-CCNC: 27 U/L (ref 0–45)
CREAT SERPL-MCNC: 0.68 MG/DL (ref 0.51–0.95)
CREAT UR-MCNC: 85.8 MG/DL
EGFRCR SERPLBLD CKD-EPI 2021: >90 ML/MIN/1.73M2
ERYTHROCYTE [DISTWIDTH] IN BLOOD BY AUTOMATED COUNT: 14.5 % (ref 10–15)
HCT VFR BLD AUTO: 38.5 % (ref 35–47)
HGB BLD-MCNC: 11.8 G/DL (ref 11.7–15.7)
HGB BLD-MCNC: 12.7 G/DL (ref 11.7–15.7)
MCH RBC QN AUTO: 28.7 PG (ref 26.5–33)
MCHC RBC AUTO-ENTMCNC: 33 G/DL (ref 31.5–36.5)
MCV RBC AUTO: 87 FL (ref 78–100)
PLATELET # BLD AUTO: 256 10E3/UL (ref 150–450)
PROT/CREAT 24H UR: 1.07 MG/MG CR (ref 0–0.2)
RBC # BLD AUTO: 4.42 10E6/UL (ref 3.8–5.2)
SPECIMEN EXPIRATION DATE: NORMAL
T PALLIDUM AB SER QL: NONREACTIVE
WBC # BLD AUTO: 12 10E3/UL (ref 4–11)

## 2024-07-27 PROCEDURE — 250N000011 HC RX IP 250 OP 636: Performed by: OBSTETRICS & GYNECOLOGY

## 2024-07-27 PROCEDURE — 722N000001 HC LABOR CARE VAGINAL DELIVERY SINGLE

## 2024-07-27 PROCEDURE — 86900 BLOOD TYPING SEROLOGIC ABO: CPT | Performed by: OBSTETRICS & GYNECOLOGY

## 2024-07-27 PROCEDURE — 370N000003 HC ANESTHESIA WARD SERVICE: Performed by: ANESTHESIOLOGY

## 2024-07-27 PROCEDURE — 85018 HEMOGLOBIN: CPT | Performed by: OBSTETRICS & GYNECOLOGY

## 2024-07-27 PROCEDURE — 84450 TRANSFERASE (AST) (SGOT): CPT | Performed by: OBSTETRICS & GYNECOLOGY

## 2024-07-27 PROCEDURE — 82565 ASSAY OF CREATININE: CPT | Performed by: OBSTETRICS & GYNECOLOGY

## 2024-07-27 PROCEDURE — 250N000009 HC RX 250: Performed by: OBSTETRICS & GYNECOLOGY

## 2024-07-27 PROCEDURE — 36415 COLL VENOUS BLD VENIPUNCTURE: CPT | Performed by: OBSTETRICS & GYNECOLOGY

## 2024-07-27 PROCEDURE — 999N000079 HC STATISTIC IP LACTATION SERVICES 1-15 MIN

## 2024-07-27 PROCEDURE — 250N000013 HC RX MED GY IP 250 OP 250 PS 637: Performed by: OBSTETRICS & GYNECOLOGY

## 2024-07-27 PROCEDURE — 258N000003 HC RX IP 258 OP 636: Performed by: OBSTETRICS & GYNECOLOGY

## 2024-07-27 PROCEDURE — 85027 COMPLETE CBC AUTOMATED: CPT | Performed by: OBSTETRICS & GYNECOLOGY

## 2024-07-27 PROCEDURE — 84460 ALANINE AMINO (ALT) (SGPT): CPT | Performed by: OBSTETRICS & GYNECOLOGY

## 2024-07-27 PROCEDURE — 120N000001 HC R&B MED SURG/OB

## 2024-07-27 PROCEDURE — 84156 ASSAY OF PROTEIN URINE: CPT | Performed by: OBSTETRICS & GYNECOLOGY

## 2024-07-27 PROCEDURE — 258N000003 HC RX IP 258 OP 636: Performed by: ANESTHESIOLOGY

## 2024-07-27 PROCEDURE — 250N000011 HC RX IP 250 OP 636: Performed by: ANESTHESIOLOGY

## 2024-07-27 PROCEDURE — 86780 TREPONEMA PALLIDUM: CPT | Performed by: OBSTETRICS & GYNECOLOGY

## 2024-07-27 RX ORDER — NALOXONE HYDROCHLORIDE 0.4 MG/ML
0.2 INJECTION, SOLUTION INTRAMUSCULAR; INTRAVENOUS; SUBCUTANEOUS
Status: DISCONTINUED | OUTPATIENT
Start: 2024-07-27 | End: 2024-07-27 | Stop reason: HOSPADM

## 2024-07-27 RX ORDER — METHYLERGONOVINE MALEATE 0.2 MG/ML
200 INJECTION INTRAVENOUS
Status: DISCONTINUED | OUTPATIENT
Start: 2024-07-27 | End: 2024-07-28 | Stop reason: HOSPADM

## 2024-07-27 RX ORDER — DOCUSATE SODIUM 100 MG/1
100 CAPSULE, LIQUID FILLED ORAL DAILY
Status: DISCONTINUED | OUTPATIENT
Start: 2024-07-27 | End: 2024-07-28 | Stop reason: HOSPADM

## 2024-07-27 RX ORDER — FENTANYL CITRATE 50 UG/ML
100 INJECTION, SOLUTION INTRAMUSCULAR; INTRAVENOUS
Status: DISCONTINUED | OUTPATIENT
Start: 2024-07-27 | End: 2024-07-27 | Stop reason: HOSPADM

## 2024-07-27 RX ORDER — MISOPROSTOL 200 UG/1
800 TABLET ORAL
Status: DISCONTINUED | OUTPATIENT
Start: 2024-07-27 | End: 2024-07-28 | Stop reason: HOSPADM

## 2024-07-27 RX ORDER — CARBOPROST TROMETHAMINE 250 UG/ML
250 INJECTION, SOLUTION INTRAMUSCULAR
Status: DISCONTINUED | OUTPATIENT
Start: 2024-07-27 | End: 2024-07-27 | Stop reason: HOSPADM

## 2024-07-27 RX ORDER — CITRIC ACID/SODIUM CITRATE 334-500MG
30 SOLUTION, ORAL ORAL
Status: DISCONTINUED | OUTPATIENT
Start: 2024-07-27 | End: 2024-07-27 | Stop reason: HOSPADM

## 2024-07-27 RX ORDER — NALOXONE HYDROCHLORIDE 0.4 MG/ML
0.2 INJECTION, SOLUTION INTRAMUSCULAR; INTRAVENOUS; SUBCUTANEOUS
Status: DISCONTINUED | OUTPATIENT
Start: 2024-07-27 | End: 2024-07-28 | Stop reason: HOSPADM

## 2024-07-27 RX ORDER — PROCHLORPERAZINE MALEATE 10 MG
10 TABLET ORAL EVERY 6 HOURS PRN
Status: DISCONTINUED | OUTPATIENT
Start: 2024-07-27 | End: 2024-07-27 | Stop reason: HOSPADM

## 2024-07-27 RX ORDER — HYDROCORTISONE 25 MG/G
CREAM TOPICAL 3 TIMES DAILY PRN
Qty: 30 G | Refills: 0 | Status: SHIPPED | OUTPATIENT
Start: 2024-07-27

## 2024-07-27 RX ORDER — NALBUPHINE HYDROCHLORIDE 10 MG/ML
2.5-5 INJECTION, SOLUTION INTRAMUSCULAR; INTRAVENOUS; SUBCUTANEOUS EVERY 6 HOURS PRN
Status: DISCONTINUED | OUTPATIENT
Start: 2024-07-27 | End: 2024-07-28 | Stop reason: HOSPADM

## 2024-07-27 RX ORDER — LOPERAMIDE HCL 2 MG
2 CAPSULE ORAL
Status: DISCONTINUED | OUTPATIENT
Start: 2024-07-27 | End: 2024-07-28 | Stop reason: HOSPADM

## 2024-07-27 RX ORDER — MODIFIED LANOLIN
OINTMENT (GRAM) TOPICAL
Status: DISCONTINUED | OUTPATIENT
Start: 2024-07-27 | End: 2024-07-28 | Stop reason: HOSPADM

## 2024-07-27 RX ORDER — LOPERAMIDE HCL 2 MG
4 CAPSULE ORAL
Status: DISCONTINUED | OUTPATIENT
Start: 2024-07-27 | End: 2024-07-28 | Stop reason: HOSPADM

## 2024-07-27 RX ORDER — KETOROLAC TROMETHAMINE 30 MG/ML
30 INJECTION, SOLUTION INTRAMUSCULAR; INTRAVENOUS
Status: COMPLETED | OUTPATIENT
Start: 2024-07-27 | End: 2024-07-27

## 2024-07-27 RX ORDER — MISOPROSTOL 200 UG/1
400 TABLET ORAL
Status: DISCONTINUED | OUTPATIENT
Start: 2024-07-27 | End: 2024-07-27 | Stop reason: HOSPADM

## 2024-07-27 RX ORDER — IBUPROFEN 800 MG/1
800 TABLET, FILM COATED ORAL EVERY 6 HOURS PRN
Qty: 40 TABLET | Refills: 1 | Status: SHIPPED | OUTPATIENT
Start: 2024-07-27

## 2024-07-27 RX ORDER — OXYTOCIN/0.9 % SODIUM CHLORIDE 30/500 ML
100-340 PLASTIC BAG, INJECTION (ML) INTRAVENOUS CONTINUOUS PRN
Status: DISCONTINUED | OUTPATIENT
Start: 2024-07-27 | End: 2024-07-28 | Stop reason: HOSPADM

## 2024-07-27 RX ORDER — OXYTOCIN/0.9 % SODIUM CHLORIDE 30/500 ML
340 PLASTIC BAG, INJECTION (ML) INTRAVENOUS CONTINUOUS PRN
Status: DISCONTINUED | OUTPATIENT
Start: 2024-07-27 | End: 2024-07-27 | Stop reason: HOSPADM

## 2024-07-27 RX ORDER — ONDANSETRON 4 MG/1
4 TABLET, ORALLY DISINTEGRATING ORAL EVERY 6 HOURS PRN
Status: DISCONTINUED | OUTPATIENT
Start: 2024-07-27 | End: 2024-07-27

## 2024-07-27 RX ORDER — OXYTOCIN 10 [USP'U]/ML
10 INJECTION, SOLUTION INTRAMUSCULAR; INTRAVENOUS
Status: DISCONTINUED | OUTPATIENT
Start: 2024-07-27 | End: 2024-07-27 | Stop reason: HOSPADM

## 2024-07-27 RX ORDER — METOCLOPRAMIDE HYDROCHLORIDE 5 MG/ML
10 INJECTION INTRAMUSCULAR; INTRAVENOUS EVERY 6 HOURS PRN
Status: DISCONTINUED | OUTPATIENT
Start: 2024-07-27 | End: 2024-07-27 | Stop reason: HOSPADM

## 2024-07-27 RX ORDER — CEFAZOLIN SODIUM/WATER 2 G/20 ML
2 SYRINGE (ML) INTRAVENOUS ONCE
Status: DISCONTINUED | OUTPATIENT
Start: 2024-07-27 | End: 2024-07-27

## 2024-07-27 RX ORDER — ONDANSETRON 4 MG/1
4 TABLET, ORALLY DISINTEGRATING ORAL EVERY 6 HOURS PRN
Status: DISCONTINUED | OUTPATIENT
Start: 2024-07-27 | End: 2024-07-27 | Stop reason: HOSPADM

## 2024-07-27 RX ORDER — EPHEDRINE SULFATE 50 MG/ML
5 INJECTION, SOLUTION INTRAMUSCULAR; INTRAVENOUS; SUBCUTANEOUS
Status: DISCONTINUED | OUTPATIENT
Start: 2024-07-27 | End: 2024-07-27 | Stop reason: HOSPADM

## 2024-07-27 RX ORDER — IBUPROFEN 800 MG/1
800 TABLET, FILM COATED ORAL
Status: COMPLETED | OUTPATIENT
Start: 2024-07-27 | End: 2024-07-27

## 2024-07-27 RX ORDER — TRANEXAMIC ACID 10 MG/ML
1 INJECTION, SOLUTION INTRAVENOUS EVERY 30 MIN PRN
Status: DISCONTINUED | OUTPATIENT
Start: 2024-07-27 | End: 2024-07-27 | Stop reason: HOSPADM

## 2024-07-27 RX ORDER — NALOXONE HYDROCHLORIDE 0.4 MG/ML
0.4 INJECTION, SOLUTION INTRAMUSCULAR; INTRAVENOUS; SUBCUTANEOUS
Status: DISCONTINUED | OUTPATIENT
Start: 2024-07-27 | End: 2024-07-28 | Stop reason: HOSPADM

## 2024-07-27 RX ORDER — LOPERAMIDE HCL 2 MG
4 CAPSULE ORAL
Status: DISCONTINUED | OUTPATIENT
Start: 2024-07-27 | End: 2024-07-27 | Stop reason: HOSPADM

## 2024-07-27 RX ORDER — HYDROCORTISONE 25 MG/G
CREAM TOPICAL 3 TIMES DAILY PRN
Status: DISCONTINUED | OUTPATIENT
Start: 2024-07-27 | End: 2024-07-28 | Stop reason: HOSPADM

## 2024-07-27 RX ORDER — OXYTOCIN 10 [USP'U]/ML
10 INJECTION, SOLUTION INTRAMUSCULAR; INTRAVENOUS
Status: DISCONTINUED | OUTPATIENT
Start: 2024-07-27 | End: 2024-07-28 | Stop reason: HOSPADM

## 2024-07-27 RX ORDER — FENTANYL/BUPIVACAINE/NS/PF 2-1250MCG
PLASTIC BAG, INJECTION (ML) INJECTION
Status: COMPLETED
Start: 2024-07-27 | End: 2024-07-27

## 2024-07-27 RX ORDER — TRANEXAMIC ACID 10 MG/ML
1 INJECTION, SOLUTION INTRAVENOUS EVERY 30 MIN PRN
Status: DISCONTINUED | OUTPATIENT
Start: 2024-07-27 | End: 2024-07-28 | Stop reason: HOSPADM

## 2024-07-27 RX ORDER — LIDOCAINE 40 MG/G
CREAM TOPICAL
Status: DISCONTINUED | OUTPATIENT
Start: 2024-07-27 | End: 2024-07-27 | Stop reason: HOSPADM

## 2024-07-27 RX ORDER — OXYTOCIN/0.9 % SODIUM CHLORIDE 30/500 ML
340 PLASTIC BAG, INJECTION (ML) INTRAVENOUS CONTINUOUS PRN
Status: DISCONTINUED | OUTPATIENT
Start: 2024-07-27 | End: 2024-07-28 | Stop reason: HOSPADM

## 2024-07-27 RX ORDER — PROCHLORPERAZINE 25 MG
25 SUPPOSITORY, RECTAL RECTAL EVERY 12 HOURS PRN
Status: DISCONTINUED | OUTPATIENT
Start: 2024-07-27 | End: 2024-07-27 | Stop reason: HOSPADM

## 2024-07-27 RX ORDER — ACETAMINOPHEN 325 MG/1
650 TABLET ORAL EVERY 4 HOURS PRN
Status: DISCONTINUED | OUTPATIENT
Start: 2024-07-27 | End: 2024-07-28 | Stop reason: HOSPADM

## 2024-07-27 RX ORDER — MISOPROSTOL 200 UG/1
400 TABLET ORAL
Status: DISCONTINUED | OUTPATIENT
Start: 2024-07-27 | End: 2024-07-28 | Stop reason: HOSPADM

## 2024-07-27 RX ORDER — LOPERAMIDE HCL 2 MG
2 CAPSULE ORAL
Status: DISCONTINUED | OUTPATIENT
Start: 2024-07-27 | End: 2024-07-27 | Stop reason: HOSPADM

## 2024-07-27 RX ORDER — METHYLERGONOVINE MALEATE 0.2 MG/ML
200 INJECTION INTRAVENOUS
Status: DISCONTINUED | OUTPATIENT
Start: 2024-07-27 | End: 2024-07-27 | Stop reason: HOSPADM

## 2024-07-27 RX ORDER — NALOXONE HYDROCHLORIDE 0.4 MG/ML
0.4 INJECTION, SOLUTION INTRAMUSCULAR; INTRAVENOUS; SUBCUTANEOUS
Status: DISCONTINUED | OUTPATIENT
Start: 2024-07-27 | End: 2024-07-27 | Stop reason: HOSPADM

## 2024-07-27 RX ORDER — IBUPROFEN 800 MG/1
800 TABLET, FILM COATED ORAL EVERY 6 HOURS PRN
Status: DISCONTINUED | OUTPATIENT
Start: 2024-07-27 | End: 2024-07-28 | Stop reason: HOSPADM

## 2024-07-27 RX ORDER — MODIFIED LANOLIN
OINTMENT (GRAM) TOPICAL
Qty: 7 G | Refills: 3 | Status: SHIPPED | OUTPATIENT
Start: 2024-07-27

## 2024-07-27 RX ORDER — DOCUSATE SODIUM 100 MG/1
100 CAPSULE, LIQUID FILLED ORAL DAILY
Qty: 30 CAPSULE | Refills: 0 | Status: SHIPPED | OUTPATIENT
Start: 2024-07-28

## 2024-07-27 RX ORDER — ACETAMINOPHEN 325 MG/1
650 TABLET ORAL EVERY 4 HOURS PRN
Qty: 40 TABLET | Refills: 1 | Status: SHIPPED | OUTPATIENT
Start: 2024-07-27

## 2024-07-27 RX ORDER — METOCLOPRAMIDE 10 MG/1
10 TABLET ORAL EVERY 6 HOURS PRN
Status: DISCONTINUED | OUTPATIENT
Start: 2024-07-27 | End: 2024-07-27 | Stop reason: HOSPADM

## 2024-07-27 RX ORDER — CARBOPROST TROMETHAMINE 250 UG/ML
250 INJECTION, SOLUTION INTRAMUSCULAR
Status: DISCONTINUED | OUTPATIENT
Start: 2024-07-27 | End: 2024-07-28 | Stop reason: HOSPADM

## 2024-07-27 RX ORDER — ONDANSETRON 2 MG/ML
4 INJECTION INTRAMUSCULAR; INTRAVENOUS EVERY 6 HOURS PRN
Status: DISCONTINUED | OUTPATIENT
Start: 2024-07-27 | End: 2024-07-27

## 2024-07-27 RX ORDER — FENTANYL CITRATE 50 UG/ML
100 INJECTION, SOLUTION INTRAMUSCULAR; INTRAVENOUS ONCE
Status: DISCONTINUED | OUTPATIENT
Start: 2024-07-27 | End: 2024-07-27 | Stop reason: HOSPADM

## 2024-07-27 RX ORDER — MISOPROSTOL 200 UG/1
800 TABLET ORAL
Status: DISCONTINUED | OUTPATIENT
Start: 2024-07-27 | End: 2024-07-27 | Stop reason: HOSPADM

## 2024-07-27 RX ORDER — CEFAZOLIN SODIUM 2 G/100ML
2 INJECTION, SOLUTION INTRAVENOUS ONCE
Status: COMPLETED | OUTPATIENT
Start: 2024-07-27 | End: 2024-07-27

## 2024-07-27 RX ORDER — OXYCODONE HYDROCHLORIDE 5 MG/1
5 TABLET ORAL EVERY 4 HOURS PRN
Status: DISCONTINUED | OUTPATIENT
Start: 2024-07-27 | End: 2024-07-28 | Stop reason: HOSPADM

## 2024-07-27 RX ORDER — FENTANYL CITRATE 50 UG/ML
INJECTION, SOLUTION INTRAMUSCULAR; INTRAVENOUS
Status: COMPLETED | OUTPATIENT
Start: 2024-07-27 | End: 2024-07-27

## 2024-07-27 RX ORDER — SODIUM CHLORIDE, SODIUM LACTATE, POTASSIUM CHLORIDE, CALCIUM CHLORIDE 600; 310; 30; 20 MG/100ML; MG/100ML; MG/100ML; MG/100ML
INJECTION, SOLUTION INTRAVENOUS CONTINUOUS
Status: DISCONTINUED | OUTPATIENT
Start: 2024-07-27 | End: 2024-07-27 | Stop reason: HOSPADM

## 2024-07-27 RX ORDER — BISACODYL 10 MG
10 SUPPOSITORY, RECTAL RECTAL DAILY PRN
Status: DISCONTINUED | OUTPATIENT
Start: 2024-07-27 | End: 2024-07-28 | Stop reason: HOSPADM

## 2024-07-27 RX ORDER — ONDANSETRON 2 MG/ML
4 INJECTION INTRAMUSCULAR; INTRAVENOUS EVERY 6 HOURS PRN
Status: DISCONTINUED | OUTPATIENT
Start: 2024-07-27 | End: 2024-07-27 | Stop reason: HOSPADM

## 2024-07-27 RX ADMIN — Medication: at 00:49

## 2024-07-27 RX ADMIN — IBUPROFEN 800 MG: 800 TABLET ORAL at 22:00

## 2024-07-27 RX ADMIN — FENTANYL CITRATE 100 MCG: 50 INJECTION INTRAMUSCULAR; INTRAVENOUS at 00:42

## 2024-07-27 RX ADMIN — ACETAMINOPHEN 650 MG: 325 TABLET, FILM COATED ORAL at 05:27

## 2024-07-27 RX ADMIN — Medication 340 ML/HR: at 02:19

## 2024-07-27 RX ADMIN — SODIUM CHLORIDE, POTASSIUM CHLORIDE, SODIUM LACTATE AND CALCIUM CHLORIDE 1000 ML: 600; 310; 30; 20 INJECTION, SOLUTION INTRAVENOUS at 00:20

## 2024-07-27 RX ADMIN — IBUPROFEN 800 MG: 800 TABLET ORAL at 16:12

## 2024-07-27 RX ADMIN — DOCUSATE SODIUM 100 MG: 100 CAPSULE, LIQUID FILLED ORAL at 09:09

## 2024-07-27 RX ADMIN — BUPIVACAINE HYDROCHLORIDE 10 ML: 2.5 INJECTION, SOLUTION EPIDURAL; INFILTRATION; INTRACAUDAL at 00:32

## 2024-07-27 RX ADMIN — IBUPROFEN 800 MG: 800 TABLET ORAL at 09:11

## 2024-07-27 RX ADMIN — KETOROLAC TROMETHAMINE 30 MG: 30 INJECTION, SOLUTION INTRAMUSCULAR at 02:56

## 2024-07-27 RX ADMIN — FENTANYL CITRATE 100 MCG: 50 INJECTION, SOLUTION INTRAMUSCULAR; INTRAVENOUS at 00:28

## 2024-07-27 RX ADMIN — CEFAZOLIN SODIUM 2 G: 2 INJECTION, SOLUTION INTRAVENOUS at 06:11

## 2024-07-27 RX ADMIN — METHYLERGONOVINE MALEATE 200 MCG: 0.2 INJECTION, SOLUTION INTRAMUSCULAR; INTRAVENOUS at 02:38

## 2024-07-27 ASSESSMENT — ACTIVITIES OF DAILY LIVING (ADL)
ADLS_ACUITY_SCORE: 18

## 2024-07-27 NOTE — PLAN OF CARE
"Goal Outcome Evaluation:      Plan of Care Reviewed With: patient, spouse    Overall Patient Progress: improvingOverall Patient Progress: improving    Outcome Evaluation: Pt up ad rebecca, breastfeeding and pumping, ibu for pain      Problem: Adult Inpatient Plan of Care  Goal: Plan of Care Review  Description: The Plan of Care Review/Shift note should be completed every shift.  The Outcome Evaluation is a brief statement about your assessment that the patient is improving, declining, or no change.  This information will be displayed automatically on your shift  note.  Outcome: Progressing  Flowsheets (Taken 7/27/2024 1818)  Outcome Evaluation: Pt up ad rebecca, breastfeeding and pumping, ibu for pain  Plan of Care Reviewed With:   patient   spouse  Overall Patient Progress: improving  Goal: Patient-Specific Goal (Individualized)  Description: You can add care plan individualizations to a care plan. Examples of Individualization might be:  \"Parent requests to be called daily at 9am for status\", \"I have a hard time hearing out of my right ear\", or \"Do not touch me to wake me up as it startles  me\".  Outcome: Progressing  Goal: Absence of Hospital-Acquired Illness or Injury  Outcome: Progressing  Intervention: Prevent Skin Injury  Recent Flowsheet Documentation  Taken 7/27/2024 1720 by Mandie Saul RN  Body Position: position changed independently  Intervention: Prevent Infection  Recent Flowsheet Documentation  Taken 7/27/2024 1720 by Mandie Saul RN  Infection Prevention:   hand hygiene promoted   rest/sleep promoted   single patient room provided  Goal: Optimal Comfort and Wellbeing  Outcome: Progressing  Intervention: Provide Person-Centered Care  Recent Flowsheet Documentation  Taken 7/27/2024 1720 by Mandie Saul RN  Trust Relationship/Rapport:   care explained   questions answered   questions encouraged   reassurance provided   thoughts/feelings acknowledged  Goal: Readiness for Transition of " Care  Outcome: Progressing     Problem: Postpartum (Vaginal Delivery)  Goal: Successful Parent Role Transition  Outcome: Progressing  Intervention: Support Parent Role Transition  Recent Flowsheet Documentation  Taken 7/27/2024 1720 by Mandie Saul RN  Supportive Measures:   active listening utilized   decision-making supported  Goal: Hemostasis  Outcome: Progressing  Goal: Absence of Infection Signs and Symptoms  Outcome: Progressing  Goal: Anesthesia/Sedation Recovery  Outcome: Progressing  Goal: Optimal Pain Control and Function  Outcome: Progressing  Goal: Effective Urinary Elimination  Outcome: Progressing     Problem: Pain Acute  Goal: Optimal Pain Control and Function  Outcome: Progressing  Intervention: Prevent or Manage Pain  Recent Flowsheet Documentation  Taken 7/27/2024 1720 by Mandie Saul RN  Sensory Stimulation Regulation: care clustered  Intervention: Optimize Psychosocial Wellbeing  Recent Flowsheet Documentation  Taken 7/27/2024 1720 by Mandie Saul RN  Supportive Measures:   active listening utilized   decision-making supported

## 2024-07-27 NOTE — PROVIDER NOTIFICATION
07/27/24 0003   Provider Notification   Provider Name/Title Dr. Aldana   Method of Notification Phone     MD updated on patient status. Reporting increased pain with contractions, still irritable with irregular contractions. SVE 4/75/-2. Orders to admit and draw pre-e labs.

## 2024-07-27 NOTE — PLAN OF CARE
"Assumed care of pt at 0500. Vital signs stable with the exception of mildly elevated Bps (130s). Postpartum checks WDL. Pt is up ad rebecca. Due to void by 0900. Pt is independent in self cares. Pt is Breast feeding every 2-3 hrs, tolerating well. Pain well controlled with Tylenol and Ibuprofen. Pt stated increased comfort after each medication. Ancef given x1. Pt is attentive to all  cues and cares. Positive bonding observed. FOB at bedside, supportive of pt.            Goal Outcome Evaluation:      Plan of Care Reviewed With: patient, spouse    Overall Patient Progress: improvingOverall Patient Progress: improving      Problem: Adult Inpatient Plan of Care  Goal: Plan of Care Review  Description: The Plan of Care Review/Shift note should be completed every shift.  The Outcome Evaluation is a brief statement about your assessment that the patient is improving, declining, or no change.  This information will be displayed automatically on your shift  note.  2024 by Helena Meyers RN  Outcome: Progressing  2024 by Helena Meyers RN  Outcome: Progressing  Flowsheets (Taken 2024)  Plan of Care Reviewed With:   patient   spouse  Overall Patient Progress: improving  Goal: Patient-Specific Goal (Individualized)  Description: You can add care plan individualizations to a care plan. Examples of Individualization might be:  \"Parent requests to be called daily at 9am for status\", \"I have a hard time hearing out of my right ear\", or \"Do not touch me to wake me up as it startles  me\".  2024 by Helena Meyers RN  Outcome: Progressing  2024 by Helena Meyers RN  Outcome: Progressing  Goal: Absence of Hospital-Acquired Illness or Injury  2024 by Helena Meyers RN  Outcome: Progressing  2024 by Helena Meyers RN  Outcome: Progressing  Goal: Optimal Comfort and Wellbeing  2024 by Helena Meyers RN  Outcome: Progressing  2024 by " Mira, Helena, RN  Outcome: Progressing  Goal: Readiness for Transition of Care  7/27/2024 0520 by Helena Meyers RN  Outcome: Progressing  7/27/2024 0519 by Helena Meyers RN  Outcome: Progressing    Problem: Postpartum (Vaginal Delivery)  Goal: Successful Parent Role Transition  7/27/2024 0520 by Helena Meyers RN  Outcome: Progressing  7/27/2024 0519 by Helena Meyers RN  Outcome: Progressing  Goal: Hemostasis  7/27/2024 0520 by Helena Meyers RN  Outcome: Progressing  7/27/2024 0519 by Helena Meyers RN  Outcome: Progressing  Goal: Absence of Infection Signs and Symptoms  7/27/2024 0520 by Helena Meyers RN  Outcome: Progressing  7/27/2024 0519 by Helena Meyers RN  Outcome: Progressing  Goal: Anesthesia/Sedation Recovery  7/27/2024 0520 by Helena Meyers RN  Outcome: Progressing  7/27/2024 0519 by Helena Meyers RN  Outcome: Progressing  Goal: Optimal Pain Control and Function  7/27/2024 0520 by Helena Meyers RN  Outcome: Progressing  7/27/2024 0519 by Helena Meyers RN  Outcome: Progressing  Goal: Effective Urinary Elimination  7/27/2024 0520 by Helena Meyers RN  Outcome: Progressing  7/27/2024 0519 by Helena Meyers RN  Outcome: Progressing     Problem: Pain Acute  Goal: Optimal Pain Control and Function  7/27/2024 0520 by Helena Meyers RN  Outcome: Progressing  7/27/2024 0519 by Helena Meyers RN  Outcome: Progressing

## 2024-07-27 NOTE — L&D DELIVERY NOTE
Conchis Metzger is a 33 year old  who was admitted at 39w2d for active labor.  Pregnancy was complicated by hx of PPH. Under epidural anesthesia  the head was delivered in the OA position, a tight nuchal cord was noted which was doubly clamped and cut, baby was delivered without complications. No shoulder dystocia was noted . Delayed cord clamp was not done. A viable male infant was delivered at 0223 with APGARs of 8 and 9 respectively.  The placenta was not delivered spontaneously within the first 10 min despite of appropriate maneuver, given it felt so tight I did a manual check and noticed placenta was significantly adhered and up in her uterus (fundal). Pitocin was stopped thinking an early contraction of the myometrium could be retaining placenta. Depsite of the latter and maneuvers placenta still was not able to be delivered. I decided to do a digital exam again and found her placenta to still be significantly adhered. I attempted digitally to swipe membranes and ultimately after 15 min placenta was delivered. Brisk bleeding was noted for which pitocin was restarted and methergine IM X 1 as given. A manual swipe was done removing clots and a minimal amount of membranes.  Hemostasis was achieved. A 3-V cord ensued shortly thereafter.  She received 30 units of IV pitocin as a uterotonic agent.  Exam of the perineum revealed no lacerations suture.  QBL 340cc.      Dr. Aldana   [phone]      Delivery Summary    Conchis Metzger MRN# [id number]   Age: 33 year old YOB: 1990          Shira Metzger-Conchis [[id number]]      Rupture date/time: 24 2340   Rupture type: Spontaneous Rupture of Membranes  Fluid color: Clear  Fluid odor: Normal     Augmentation: None       Delivery/Placenta Date and Time      Delivery Date: 24 Delivery Time:  2:17 AM   Placenta Date/Time: 2024  2:31 AM  Oxytocin given at the time of delivery: after delivery of baby   Other personnel present at  delivery:  Provider Role   Neema San MD              Vaginal Counts       Initial count performed by 2 team members:  Two Team Members   dr leny ball         Needles Suture Needles Sponges (RETIRED) Instruments   Initial counts 2  5    Added to count   5    Relief counts       Final counts               Placed during labor Accounted for at the end of labor   FSE NA NA   IUPC NA NA   Cervidil NA NA                             Apgars    Living status: Living   1 Minute 5 Minute 10 Minute 15 Minute 20 Minute   Skin color: 0  1       Heart rate: 2  2       Reflex irritability: 2  2       Muscle tone: 2  2       Respiratory effort: 2  2       Total: 8  9              Cord      Cord Complications: None               Cord Blood Disposition: Discard    Gases Sent?: No    Delayed cord clamping?: No    Stem cell collection?: No            Resuscitation    Methods: None       Skin to Skin and Feeding Plan      Skin to skin initiation date/time: 1841    Skin to skin with: Mother  Skin to skin end date/time:            Labor Events and Shoulder Dystocia    Fetal Tracing Prior to Delivery: Category 2  Fetal Tracing Comments: deep variables with contractions, tight nucal cord  Shoulder dystocia present?: Neg       Delivery (Maternal) (Provider to Complete) ([id number])    Episiotomy: None  Perineal lacerations: None    Repair suture: None  Genital tract inspection done: Pos       Blood Loss  Mother: Conchis Metzger #[id number]     Start of Mother's Information      Delivery Blood Loss  24 1417 - 24 0258      Delivery QBL (mL) Hospital Encounter 340 mL    Total  340 mL               End of Mother's Information  Mother: Conchis Metzger #[id number]                Delivery - Provider to Complete ([id number])    Delivery Type (Choose the 1 that will go to the Birth History): Vaginal, Spontaneous                         Other personnel:  Provider Role   Neema San MD                      Placenta    Date/Time: 7/27/2024  2:31 AM  Removal: Expressed, Manual Removal  Disposition: Hospital disposal             Anesthesia    Method: Epidural  Cervical dilation at placement: 0-3                    Presentation and Position    Presentation: Vertex    Position: Middle Occiput Anterior                     Neema Lema MD

## 2024-07-27 NOTE — PROGRESS NOTES
"[facility] Postpartum Note    S:  Conchis Metzger feels well this morning. Was not able to sleep last night. Pain control adequate. Lochia minimal. Voiding. Breastfeeding. Mood Good.     O:  Vitals were reviewed  Blood pressure 136/79, pulse 60, temperature 98  F (36.7  C), temperature source Oral, resp. rate 16, unknown if currently breastfeeding.    General: healthy, alert, and no distress  Abd: soft, appropriately tender, fundus firm  Legs: Non-tender, 0+ pitting edema    No results found for: \"RH\"  Rubella: immune    Assessment and Plan:   Postpartum Day #0, status post vaginal delivery, doing well.  -- Routine care  -- F/U 6 weeks w/ Primary OB  -- Discharge meds: see med rec  -- Contraception: considering    S/p Retained Placenta  -s/p Ancef for eugene sweeps  -QBL 350mL  -hb 12.7>350>11.8    Flavia Villegas MD  "

## 2024-07-27 NOTE — PROVIDER NOTIFICATION
[id number]   Provider Notification   Provider Name/Title Dr. Aldana   Method of Notification Phone   Request Evaluate - Remote   Notification Reason Patient Arrived     MD notified of patient arrival.  at 39&1 weeks, here for rule out labor. Hx of PPH with first, otherwise uncomplicated pregnancy. Pt had her membranes stripped in the clinic yesterday and was 3/50/-2 & feeling irregular contractions throughout the day but reports more regular contractions starting at 8pm. Denies LOF, vaginal bleeding & pre e symptoms. Initial /91 & follow up 137/84. SVE 3.5/70/-2 & irritability noted on toco q1min and contractions about q8min. FHR moderate with accels and no decels. Orders to recheck in 1 hour and if unchanged, discharge home & do pre-e labs if another BP is elevated.

## 2024-07-27 NOTE — ANESTHESIA PREPROCEDURE EVALUATION
"Anesthesia Pre-Procedure Evaluation    Patient: Conchis Metzger   MRN: [id number] : 1990        Procedure : * No procedures listed *          Past Medical History:   Diagnosis Date    Gastroesophageal reflux disease with esophagitis       Past Surgical History:   Procedure Laterality Date    DENTAL SURGERY      [place] teeth removed       No Known Allergies   Social History     Tobacco Use    Smoking status: Never    Smokeless tobacco: Never   Substance Use Topics    Alcohol use: Not Currently      Wt Readings from Last 1 Encounters:   No data found for Wt        Anesthesia Evaluation   Pt has had prior anesthetic.     No history of anesthetic complications       ROS/MED HX  ENT/Pulmonary:  - neg pulmonary ROS     Neurologic:  - neg neurologic ROS     Cardiovascular:  - neg cardiovascular ROS     METS/Exercise Tolerance:     Hematologic:       Musculoskeletal:       GI/Hepatic:  - neg GI/hepatic ROS     Renal/Genitourinary:       Endo:  - neg endo ROS     Psychiatric/Substance Use:  - neg psychiatric ROS     Infectious Disease:       Malignancy:       Other:            Physical Exam    Airway         TM distance: > 3 FB   Neck ROM: full   Mouth opening: > 3 cm    Respiratory Devices and Support         Dental           Cardiovascular             Pulmonary                   OUTSIDE LABS:  CBC:   Lab Results   Component Value Date    WBC 11.8 (H) 2022    HGB 10.4 (L) 2022    HGB 13.5 2022    HCT 40.8 2022     2022     BMP: No results found for: \"NA\", \"POTASSIUM\", \"CHLORIDE\", \"CO2\", \"BUN\", \"CR\", \"GLC\"  COAGS: No results found for: \"PTT\", \"INR\", \"FIBR\"  POC: No results found for: \"BGM\", \"HCG\", \"HCGS\"  HEPATIC: No results found for: \"ALBUMIN\", \"PROTTOTAL\", \"ALT\", \"AST\", \"GGT\", \"ALKPHOS\", \"BILITOTAL\", \"BILIDIRECT\", \"ALEXIS\"  OTHER: No results found for: \"PH\", \"LACT\", \"A1C\", \"RAÚL\", \"PHOS\", \"MAG\", \"LIPASE\", \"AMYLASE\", \"TSH\", \"T4\", \"T3\", \"CRP\", \"SED\"    Anesthesia Plan    ASA " Status:  2       Anesthesia Type: Epidural.              Consents    Anesthesia Plan(s) and associated risks, benefits, and realistic alternatives discussed. Questions answered and patient/representative(s) expressed understanding.     - Discussed:     - Discussed with:  Patient            Postoperative Care            Comments:           neg OB ROS.      Les Fan MD    I have reviewed the pertinent notes and labs in the chart from the past 30 days and (re)examined the patient.  Any updates or changes from those notes are reflected in this note.

## 2024-07-27 NOTE — PLAN OF CARE
Goal Outcome Evaluation:      Plan of Care Reviewed With: patient    Overall Patient Progress: improvingOverall Patient Progress: improving  Data: Vital signs within normal limits.except BP borderline up. Postpartum checks within normal limits - see flow record. Patient eating and drinking normally. Patient able to empty bladder independently and is up ambulating. No apparent signs of infection noted. Patient performing self cares and is able to care for infant.  Action: Patient medicated during the shift for pain and cramping. See MAR. Patient reassessed within 1 hour after each medication and pain was improved - patient stated she was comfortable. Patient education done about to watch sign and symptoms of high BP(HA, blurred vision,RUQ or significant edema)   Response: Positive attachment behaviors observed with infant. Support persons is  present and participate in pts and baby cares.Pt voiced understandings.  Plan: Anticipate discharge on tomorrow.  Problem: Adult Inpatient Plan of Care  Goal: Plan of Care Review  7/27/2024 1432 by Bardai, Rozina R, RN  Outcome: Progressing  Flowsheets (Taken 7/27/2024 1432)  Plan of Care Reviewed With: patient  Overall Patient Progress: improving  7/27/2024 1430 by Bardai, Rozina R, RN  Outcome: Progressing  Flowsheets (Taken 7/27/2024 1430)  Plan of Care Reviewed With: patient  Overall Patient Progress: improving  Goal: Patient-Specific Goal (Individualized)  7/27/2024 1432 by Bardai, Rozina R, RN  Outcome: Progressing  7/27/2024 1430 by Bardai, Rozina R, RN  Outcome: Progressing  Goal: Absence of Hospital-Acquired Illness or Injury  7/27/2024 1432 by Bardai, Rozina R, RN  Outcome: Progressing  7/27/2024 1430 by Bardai, Rozina R, RN  Outcome: Progressing  Intervention: Prevent Skin Injury  Recent Flowsheet Documentation  Taken 7/27/2024 0910 by Bardai, Rozina R, RN  Body Position: position changed independently  Intervention: Prevent Infection  Recent Flowsheet  Documentation  Taken 7/27/2024 0910 by Bardai, Rozina R, RN  Infection Prevention:   hand hygiene promoted   rest/sleep promoted   single patient room provided  Goal: Optimal Comfort and Wellbeing  7/27/2024 1432 by Bardai, Rozina R, RN  Outcome: Progressing  7/27/2024 1430 by Bardai, Rozina R, RN  Outcome: Progressing  Intervention: Monitor Pain and Promote Comfort  Recent Flowsheet Documentation  Taken 7/27/2024 1330 by Bardai, Rozina R, RN  Pain Management Interventions: declines  Intervention: Provide Person-Centered Care  Recent Flowsheet Documentation  Taken 7/27/2024 0910 by Bardai, Rozina R, RN  Trust Relationship/Rapport:   care explained   questions answered   questions encouraged   reassurance provided   thoughts/feelings acknowledged  Goal: Readiness for Transition of Care  7/27/2024 1432 by Bardai, Rozina R, RN  Outcome: Progressing  7/27/2024 1430 by Bardai, Rozina R, RN  Outcome: Progressing     Problem: Postpartum (Vaginal Delivery)  Goal: Successful Parent Role Transition  7/27/2024 1432 by Bardai, Rozina R, RN  Outcome: Progressing  7/27/2024 1430 by Bardai, Rozina R, RN  Outcome: Progressing  Intervention: Support Parent Role Transition  Recent Flowsheet Documentation  Taken 7/27/2024 0910 by Bardai, Rozina R, RN  Supportive Measures:   active listening utilized   decision-making supported  Goal: Hemostasis  7/27/2024 1432 by Bardai, Rozina R, RN  Outcome: Progressing  7/27/2024 1430 by Bardai, Rozina R, RN  Outcome: Progressing  Goal: Absence of Infection Signs and Symptoms  7/27/2024 1432 by Bardai, Rozina R, RN  Outcome: Progressing  7/27/2024 1430 by Bardai, Rozina R, RN  Outcome: Progressing  Goal: Anesthesia/Sedation Recovery  7/27/2024 1432 by Bardai, Rozina R, RN  Outcome: Progressing  7/27/2024 1430 by Bardai, Rozina R, RN  Outcome: Progressing  Goal: Optimal Pain Control and Function  7/27/2024 1432 by Bardai, Rozina R, RN  Outcome: Progressing  7/27/2024 1430 by Bardai, Rozina R,  RN  Outcome: Progressing  Intervention: Prevent or Manage Pain  Recent Flowsheet Documentation  Taken 7/27/2024 1330 by Bardai, Rozina R, RN  Pain Management Interventions: declines  Goal: Effective Urinary Elimination  7/27/2024 1432 by Bardai, Rozina R, RN  Outcome: Progressing  7/27/2024 1430 by Bardai, Rozina R, RN  Outcome: Progressing     Problem: Pain Acute  Goal: Optimal Pain Control and Function  7/27/2024 1432 by Bardai, Rozina R, RN  Outcome: Progressing  7/27/2024 1430 by Bardai, Rozina R, RN  Outcome: Progressing  Intervention: Develop Pain Management Plan  Recent Flowsheet Documentation  Taken 7/27/2024 1330 by Bardai, Rozina R, RN  Pain Management Interventions: declines  Intervention: Prevent or Manage Pain  Recent Flowsheet Documentation  Taken 7/27/2024 0910 by Bardai, Rozina R, RN  Sensory Stimulation Regulation: care clustered  Intervention: Optimize Psychosocial Wellbeing  Recent Flowsheet Documentation  Taken 7/27/2024 0910 by Bardai, Rozina R, RN  Supportive Measures:   active listening utilized   decision-making supported

## 2024-07-27 NOTE — PLAN OF CARE
Data: [facility]: 2024 10:47 PM.  Reason for maternal/fetal assessment is uterine contractions. Patient reports feeling irregular contractions throughout the past few days but more regular since 8pm.  Patient is a .  Prenatal record reviewed. Pregnancy has been uncomplicated..  Gestational Age 39w1d. VSS. Fetal movement active. Patient denies leaking of vaginal fluid/rupture of membranes, vaginal bleeding, abdominal pain, pelvic pressure, nausea, vomiting, headache, visual disturbances, epigastric or URQ pain, significant edema. Support person is present.   Action: Verbal consent for EFM. Triage assessment completed. Bill of rights reviewed.  Response: Patient verbalized agreement with plan. Will contact Dr Neema Lema with update and for further orders.

## 2024-07-27 NOTE — LACTATION NOTE
Lactation in to see patient. Second baby for family with first being jaundice. Patient states infant does latch and nurse. Encouraged frequent feeds, listening for swallows with hand expression afterwards to give any ebm back to baby. Discussed starting to pump if baby sleepy and has any missed feeds. Signs of jaundice reviewed. Will follow up tomorrow.

## 2024-07-27 NOTE — H&P
2024    Hernandez Metzger  [id number]      OB Admit History & Physical      Ms. Metzger  is here now s/p     She was initially evaluated and found to be in early labor. She then was given an epidural and progressed quickly to be complete. She pushed effectively and delivered a vigorous baby. Please see delivery note for further details.     No LMP recorded. Patient is pregnant.   Her Estimated Date of Delivery: Aug 1, 2024, making her 39w2d.      There is no height or weight on file to calculate BMI.  Her prenatal course has been  complicated by   -hx of PPH    Prenatal Care:  - OB labs reviewed: Rh pos, HIV/RPR neg, HepB Ag NR, Rubella immune, Hep C Ab neg   - Genetics: NIPS and AFP low risk   - Anatomy ultrasound: unremarkable  - GCT, CBC, Treponema WNL  - Vaccines: Flu complete, COVID complete, s/p Tdap, RSV not eligible  - GBS neg  - BC: discussed options, interested LARC VS POP   - Feed: breast, has Rx  - Peds: Dr. Flavia Mansfield with [facility] [place]          She is a 33 year old   Her OB history:   OB History    Para Term  AB Living   3 1 1 0 1 1   SAB IAB Ectopic Multiple Live Births   1 0 0 0 1      # Outcome Date GA Lbr James/2nd Weight Sex Type Anes PTL Lv   3 Current            2 Term 22 39w2d 08:45 / 01:21 3.45 kg (7 lb 9.7 oz) M Vag-Spont EPI N SHELLY      Name: WILLIAN METZGER-HERNANDEZ      Apgar1: 9  Apgar5: 9   1 SAB 2021 6w0d                   Past Medical History:   Diagnosis Date    Gastroesophageal reflux disease with esophagitis           Past Surgical History:   Procedure Laterality Date    DENTAL SURGERY      Sutter teeth removed          No current outpatient medications on file.       Allergies: Patient has no known allergies.      REVIEW OF SYSTEMS:  NEUROLOGIC:  Negative  EYES:  Negative  ENT:  Negative  GI:  Negative  :  Negative  GYN:  Negative  CV:  Negative  PULMONARY:  Negative  MUSCULOSKELETAL:  Negative  PSYCH:  Negative      Social History      Socioeconomic History    Marital status:      Spouse name: Not on file    Number of children: Not on file    Years of education: Not on file    Highest education level: Not on file   Occupational History    Not on file   Tobacco Use    Smoking status: Never    Smokeless tobacco: Never   Substance and Sexual Activity    Alcohol use: Not Currently    Drug use: Never    Sexual activity: Yes     Partners: Male   Other Topics Concern    Not on file   Social History Narrative    Not on file     Social Determinants of Health     Financial Resource Strain: Not on file   Food Insecurity: Not on file   Transportation Needs: Not on file   Physical Activity: Not on file   Stress: Not on file   Social Connections: Not on file   Interpersonal Safety: Not on file   Housing Stability: Not on file      Family History   Problem Relation Age of Onset    Hyperlipidemia Father     Hypertension Father          Exam:    Vitals:   BP (!) 140/64   Temp 98.2  F (36.8  C)   Resp 18       Constitutional: Patient is alert.   HENT:   Mouth/Throat: Moist mucous membranes.  Eyes: EOM are normal.  Neck: normal ROM.   Cardiovascular: Warm and well perfused.   Pulmonary/Chest: Effort normal.   Abdominal: Non-distended. gravid  Musculoskeletal: Normal range of motion.   Neurological: Moving all extremities.    Skin: Skin is warm and dry.   Psychiatric: Normal affect.          Assessment/Plan: Conchis Metzger is a 33 year old  at 39w2d GA here with early labor, now PPD0 ().     - routine pp care  - delivery complicated by multiple manual swipes for retained placenta   - cefazolin 2 g IV X 1   - risks for RPC and endometritis discussed   - qbl 350   - hgb am   - orders in place        Neema Lema MD  Dept of OB/GYN  2024

## 2024-07-27 NOTE — PROVIDER NOTIFICATION
07/27/24 0416   Provider Notification   Provider Name/Title Dr. Aldana   Method of Notification Electronic Page     Conchis's pcr came back as 1.07. BP's have been stable at 120-130's over 80's. bleeding stable.

## 2024-07-27 NOTE — DISCHARGE SUMMARY
[facility] Discharge Summary    Conchis Metzger MRN# [id number]   Age: 33 year old YOB: 1990     Date of Admission:  2024  Date of Discharge::  2024  4:23 PM  Admitting Physician:  Neema Lema MD  Discharge Physician:  Flavia Villegas MD     Home clinic: [facility]          Admission Diagnoses:   Encounter for triage in pregnant patient  Indication for care in labor or delivery  Hx PPH  Manual evacuation of the uterus (Ancef given)  Retained placenta with delayed delivery          Discharge Diagnosis:     Normal spontaneous vaginal delivery  Intrauterine pregnancy at 39 weeks gestation          Procedures:     Procedure(s):        No other procedures performed during this admission           Medications Prior to Admission:     Medications Prior to Admission   Medication Sig Dispense Refill Last Dose    calcium carbonate (TUMS) 500 MG chewable tablet Take 2 chew tab by mouth 2 times daily       Prenatal Vit-Fe Fumarate-FA (PRENATAL MULTIVITAMIN W/IRON) 27-0.8 MG tablet Take 1 tablet by mouth daily   2024    [DISCONTINUED] acetaminophen (TYLENOL) 325 MG tablet Take 2 tablets (650 mg) by mouth every 4 hours as needed for mild pain or fever (greater than or equal to 38  C /100.4  F (oral) or 38.5  C/ 101.4  F (core).)                Discharge Medications:     Current Discharge Medication List        START taking these medications    Details   docusate sodium (COLACE) 100 MG capsule Take 1 capsule (100 mg) by mouth daily  Qty: 30 capsule, Refills: 0    Associated Diagnoses:  (spontaneous vaginal delivery)      hydrocortisone, Perianal, (ANUSOL-HC) 2.5 % cream Place rectally 3 times daily as needed for hemorrhoids  Qty: 30 g, Refills: 0    Associated Diagnoses:  (spontaneous vaginal delivery)      ibuprofen (ADVIL/MOTRIN) 800 MG tablet Take 1 tablet (800 mg) by mouth every 6 hours as needed for other (cramping)  Qty: 40 tablet, Refills:  1    Associated Diagnoses:  (spontaneous vaginal delivery)      lanolin ointment Apply topically every hour as needed for other (sore nipples)  Qty: 7 g, Refills: 3    Associated Diagnoses:  (spontaneous vaginal delivery)           CONTINUE these medications which have CHANGED    Details   acetaminophen (TYLENOL) 325 MG tablet Take 2 tablets (650 mg) by mouth every 4 hours as needed for mild pain  Qty: 40 tablet, Refills: 1    Associated Diagnoses:  (spontaneous vaginal delivery)           CONTINUE these medications which have NOT CHANGED    Details   calcium carbonate (TUMS) 500 MG chewable tablet Take 2 chew tab by mouth 2 times daily      Prenatal Vit-Fe Fumarate-FA (PRENATAL MULTIVITAMIN W/IRON) 27-0.8 MG tablet Take 1 tablet by mouth daily                   Consultations:   No consultations were requested during this admission          Brief History of Labor:   Conchis Metzger is a 33 year old  who was admitted at 39w2d for active labor.  Pregnancy was complicated by hx of PPH. Under epidural anesthesia  the head was delivered in the OA position, a tight nuchal cord was noted which was doubly clamped and cut, baby was delivered without complications. No shoulder dystocia was noted . Delayed cord clamp was not done. A viable male infant was delivered at 0223 with APGARs of 8 and 9 respectively.  The placenta was not delivered spontaneously within the first 10 min despite of appropriate maneuver, given it felt so tight I did a manual check and noticed placenta was significantly adhered and up in her uterus (fundal). Pitocin was stopped thinking an early contraction of the myometrium could be retaining placenta. Depsite of the latter and maneuvers placenta still was not able to be delivered. I decided to do a digital exam again and found her placenta to still be significantly adhered. I attempted digitally to swipe membranes and ultimately after 15 min placenta was delivered. Brisk bleeding was  noted for which pitocin was restarted and methergine IM X 1 as given. A manual swipe was done removing clots and a minimal amount of membranes.  Hemostasis was achieved. A 3-V cord ensued shortly thereafter.  She received 30 units of IV pitocin as a uterotonic agent.  Exam of the perineum revealed no lacerations suture.  QBL 340mL           Hospital Course:   The patient's hospital course was unremarkable.  On discharge, her pain was well controlled. Vaginal bleeding is similar to peak menstrual flow.  Voiding without difficulty.  Ambulating well and tolerating a normal diet.  No fever.  Breastfeeding well.  Infant is stable.  No bowel movement yet.*  She was discharged on post-partum day #2.    Post-partum hemoglobin:   Hemoglobin   Date Value Ref Range Status   07/27/2024 11.8 11.7 - 15.7 g/dL Final             Discharge Instructions and Follow-Up:     Discharge diet: Regular   Discharge activity: No driving or operating machinery while on narcotic analgesics  Pelvic rest: abstain from intercourse and do not use tampons for 6 week(s)   Discharge follow-up: Follow up with primary care provider in 6 weeks   Wound care: Drink plenty of fluids  Ice to area for comfort           Discharge Disposition:     Discharged to home      Flavia Villegas MD on 7/31/2024 at 7:55 AM

## 2024-07-27 NOTE — ANESTHESIA PROCEDURE NOTES
Epidural catheter Procedure Note    Pre-Procedure   Staff -        Anesthesiologist:  Les Fan MD       Performed By: anesthesiologist       Location: OB       Procedure Start/Stop Times: 7/27/2024 12:32 AM and 7/27/2024 12:48 AM       Pre-Anesthestic Checklist: patient identified, IV checked, risks and benefits discussed, informed consent, monitors and equipment checked, pre-op evaluation and at physician/surgeon's request  Timeout:       Correct Patient: Yes        Correct Procedure: Yes        Correct Site: Yes        Correct Position: Yes   Procedure Documentation  Procedure: epidural catheter       Patient Position: sitting       Patient Prep/Sterile Barriers: sterile gloves, mask, patient draped       Skin prep: Betadine       Local skin infiltrated with 3 mL of 1% lidocaine.        Insertion Site: L3-4. (midline approach).       Technique: LORT saline        IDANIA at 6 cm.       Needle Type: [employer] needle       Needle Gauge: 17.        Needle Length (Inches): 3.5        Catheter: 19 G.          Catheter threaded easily.         5 cm epidural space.         Threaded 11 cm at skin.         # of attempts: 1 and  # of redirects:  0    Assessment/Narrative         Test dose of 5 mL lidocaine 1.5% w/ 1:200,000 epinephrine at 00:42 CDT.         Test dose negative, 3 minutes after injection, for signs of intravascular, subdural, or intrathecal injection.       Insertion/Infusion Method: LORT saline       Aspiration negative for Heme or CSF via Epidural Catheter.    Medication(s) Administered   0.125% bupivacaine 5 mL + fentanyl 20 mcg + NS 5 mL (Epidural) (Mixture components: BUPivacaine HCl (PF) 0.25 % Soln, 5 mL; fentaNYL (PF) 100 MCG/2ML Soln, 20 mcg; sodium chloride 0.9 % Soln, 5 mL) - EPIDURAL   10 mL - 7/27/2024 12:32:00 AM  0.125% Bupivacaine + 2 mcg/mL Fentanyl via CADD (Epidural) - EPIDURAL   10 mL - 7/27/2024 12:42:00 AM  Fentanyl PF (Epidural) - EPIDURAL   100 mcg - 7/27/2024 12:42:00  "AM  Medication Administration Time: 7/27/2024 12:32 AM     Comments:  Periflex, lot [id number], exp. 2025-09-30      FOR [facility] ONLY:   Pain Team Contact information: please page the Pain Team [address] [employer]. Search \"Pain\". During daytime hours, please page the attending first. At night please page the resident first.      "

## 2024-07-27 NOTE — PROVIDER NOTIFICATION
07/27/24 0135   Provider Notification   Provider Name/Title Dr. iDaz   Method of Notification Phone     MD paged to attend delivery. SVE 10 cm/+2 station. MD will make way to hospital.

## 2024-07-27 NOTE — PLAN OF CARE
Data: Conchis Metzger transferred to [facility] via wheelchair at 0445. Baby transferred via parent's arms.  Action: Receiving unit notified of transfer: Yes. Patient and family notified of room change. Report given to GARY Evans at 0445. Belongings sent to receiving unit. Accompanied by Registered Nurse. Oriented patient to surroundings. Call light within reach. ID bands double-checked with receiving RN.  Response: Patient tolerated transfer and is stable.    Patients mobililty level scored using the bedside mobility assistance tool (BMAT). Patient is at a mobility level test number: 4. Mobility equipment used: wheelchair. Required assist of 0 staff members. Further use of BMAT scoring not required.

## 2024-07-28 VITALS
OXYGEN SATURATION: 97 % | DIASTOLIC BLOOD PRESSURE: 79 MMHG | RESPIRATION RATE: 16 BRPM | SYSTOLIC BLOOD PRESSURE: 129 MMHG | HEART RATE: 67 BPM | TEMPERATURE: 97.7 F

## 2024-07-28 PROBLEM — O13.9 GESTATIONAL HYPERTENSION: Status: ACTIVE | Noted: 2024-07-28

## 2024-07-28 PROCEDURE — 999N000081 HC STATISTIC IP LACTATION SERVICES 31-45 MIN

## 2024-07-28 PROCEDURE — 250N000013 HC RX MED GY IP 250 OP 250 PS 637: Performed by: OBSTETRICS & GYNECOLOGY

## 2024-07-28 RX ADMIN — IBUPROFEN 800 MG: 800 TABLET ORAL at 04:06

## 2024-07-28 ASSESSMENT — ACTIVITIES OF DAILY LIVING (ADL)
ADLS_ACUITY_SCORE: 18
DEPENDENT_IADLS:: INDEPENDENT
ADLS_ACUITY_SCORE: 18

## 2024-07-28 NOTE — DISCHARGE INSTRUCTIONS
Warning Signs after Having a Baby    Keep this paper on your fridge or somewhere else where you can see it.    Call your provider if you have any of these symptoms up to 12 weeks after having your baby.    Thoughts of hurting yourself or your baby  Pain in your chest or trouble breathing  Severe headache not helped by pain medicine  Eyesight concerns (blurry vision, seeing spots or flashes of light, other changes to eyesight)  Fainting, shaking or other signs of a seizure    Call 9-1-1 if you feel that it is an emergency.     The symptoms below can happen to anyone after giving birth. They can be very serious. Call your provider if you have any of these warning signs.    My provider s phone number: _______________________    Losing too much blood (hemorrhage)    Call your provider if you soak through a pad in less than an hour or pass blood clots bigger than a golf ball. These may be signs that you are bleeding too much.    Blood clots in the legs or lungs    After you give birth, your body naturally clots its blood to help prevent blood loss. Sometimes this increased clotting can happen in other areas of the body, like the legs or lungs. This can block your blood flow and be very dangerous.     Call your provider if you:  Have a red, swollen spot on the back of your leg that is warm or painful when you touch it.   Are coughing up blood.     Infection    Call your provider if you have any of these symptoms:  Fever of 100.4 F (38 C) or higher.  Pain or redness around your stitches if you had an incision.   Any yellow, white, or green fluid coming from places where you had stitches or surgery.    Mood Problems (postpartum depression)    Many people feel sad or have mood changes after having a baby. But for some people, these mood swings are worse.     Call your provider right away if you feel so anxious or nervous that you can't care for yourself or your baby.    Preeclampsia (high blood pressure)    Even if you  "didn't have high blood pressure when you were pregnant, you are at risk for the high blood pressure disease called preeclampsia. This risk can last up to 12 weeks after giving birth.     Call your provider if you have:   Pain on your right side under your rib cage  Sudden swelling in the hands and face    Remember: You know your body. If something doesn't feel right, get medical help.     For informational purposes only. Not to replace the advice of your health care provider. Copyright 2020 [employer] [facility]. All rights reserved. Clinically reviewed by Carmel King, RNC-OB, MSN. iM[employer] [id number] - Rev .    Postpartum Care at Home With Your Baby: Care Instructions  Overview     After childbirth (postpartum period), your body goes through many changes as you recover. In these weeks after delivery, try to take good care of yourself. Get rest whenever you can and accept help from others.  It may take 4 to 6 weeks to feel like yourself again, and possibly longer if you had a  birth. You may feel sore or very tired as you recover. After delivery, you may continue to have contractions as the uterus returns to the size it was before your pregnancy. You will also have some vaginal bleeding. And you may have pain around the vagina as you heal. Several days after delivery you may also have pain and swelling in your breasts as they fill with milk. There are things you can do at home to help ease these discomforts.  After childbirth, it's common to feel emotional. You may feel irritable, cry easily, and feel happy one minute and sad the next. This is called the \"baby blues.\" Hormone changes are one cause of these emotional changes. These feelings usually get better within a couple of weeks. If they don't, talk to your doctor or midwife.  In the first couple of weeks after you give birth, your doctor or midwife may want to check in with you and make a plan for follow-up care. You will likely have a " complete postpartum visit in the first 3 months after delivery. At that time, your doctor or midwife will check on your recovery and see how you're doing. But if you have questions or concerns before then, you can always call your doctor or midwife.  Follow-up care is a key part of your treatment and safety. Be sure to make and go to all appointments, and call your doctor if you are having problems. It's also a good idea to know your test results and keep a list of the medicines you take.  How can you care for yourself at home?  Taking care of your body  Use pads instead of tampons for bleeding. After birth, you will have bloody vaginal discharge. You may also pass some blood clots that shouldn't be bigger than an egg. Over the next 6 weeks or so, your bleeding should decrease a little every day and slowly change to a pinkish and then whitish discharge.  For cramps or mild pain, try an over-the-counter pain medicine, such as acetaminophen (Tylenol) or ibuprofen (Advil, Motrin). Read and follow all instructions on the label.  To ease pain around the vagina or from hemorrhoids:  Put ice or a cold pack on the area for 10 to 20 minutes at a time. Put a thin cloth between the ice and your skin.  Try sitting in a few inches of warm water (sitz bath) when you can or after bowel movements.  Clean yourself with a gentle squeeze of warm water from a bottle instead of wiping with toilet paper.  Use witch hazel or hemorrhoid pads (such as Tucks).  Try using a cold compress for sore and swollen breasts. And wear a supportive bra that fits.  Ease constipation by drinking plenty of fluids and eating high-fiber foods. Ask your doctor or midwife about over-the-counter stool softeners.  Activity  Rest when you can.  Ask for help from family or friends when you need it.  If you can, have another adult in your home for at least 2 or 3 days after birth.  When you feel ready, try to get some exercise every day. For many people, walking  is a good choice. Don't do any heavy exercise until your doctor or midwife says it's okay.  Ask your doctor or midwife when it is okay to have vaginal sex.  If you don't want to get pregnant, talk to your doctor or midwife about birth control options. You can get pregnant even before your period returns. Also, you can get pregnant while you are breastfeeding.  Talk to your doctor or midwife if you want to get pregnant again. They can talk to you about when it is safe.  Emotional health  It's normal to have some sadness, anxiety, and mood swings after delivery. It may help to talk with a trusted friend or family member. You can also call the Maternal Mental Health Hotline at 1-762-[facility] (1-826.240.5677) for support. If these mood changes last more than a couple of weeks, talk to your doctor or midwife.  When should you call for help?  Share this information with your partner, family, or a friend. They can help you watch for warning signs.  Call 911  anytime you think you may need emergency care. For example, call if:    You feel you cannot stop from hurting yourself, your baby, or someone else.     You passed out (lost consciousness).     You have chest pain, are short of breath, or cough up blood.     You have a seizure.   Where to get help 24 hours a day, 7 days a week   If you or someone you know talks about suicide, self-harm, a mental health crisis, a substance use crisis, or any other kind of emotional distress, get help right away. You can:    Call the Suicide and Crisis Lifeline at 478.     Call 7-287-161-TALK (1-857.378.7147).     Text HOME to 213339 to access the Crisis Text Line.   Consider saving these numbers in your phone.  Go to delicious.org for more information or to chat online.  Call your doctor or midwife now or seek immediate medical care if:    You have signs of hemorrhage (too much bleeding), such as:  Heavy vaginal bleeding. This means that you are soaking through one or more pads in an  "hour. Or you pass blood clots bigger than an egg.  Feeling dizzy or lightheaded, or you feel like you may faint.  Feeling so tired or weak that you cannot do your usual activities.  A fast or irregular heartbeat.  New or worse belly pain.     You have signs of infection, such as:  A fever.  Increased pain, swelling, warmth, or redness from an incision or wound.  Frequent or painful urination or blood in your urine.  Vaginal discharge that smells bad.  New or worse belly pain.     You have symptoms of a blood clot in your leg (called a deep vein thrombosis), such as:  Pain in the calf, back of the knee, thigh, or groin.  Swelling in the leg or groin.  A color change on the leg or groin. The skin may be reddish or purplish, depending on your usual skin color.     You have signs of preeclampsia, such as:  Sudden swelling of your face, hands, or feet.  New vision problems (such as dimness, blurring, or seeing spots).  A severe headache.     You have signs of heart failure, such as:  New or increased shortness of breath.  New or worse swelling in your legs, ankles, or feet.  Sudden weight gain, such as more than 2 to 3 pounds in a day or 5 pounds in a week.  Feeling so tired or weak that you cannot do your usual activities.     You had spinal or epidural pain relief and have:  New or worse back pain.  Increased pain, swelling, warmth, or redness at the injection site.  Tingling, weakness, or numbness in your legs or groin.   Watch closely for changes in your health, and be sure to contact your doctor or midwife if:    Your vaginal bleeding isn't decreasing.     You feel sad, anxious, or hopeless for more than a few days.     You are having problems with your breasts or breastfeeding.   Where can you learn more?  Go to https://www.healthwise.net/patiented  Enter Z768 in the search box to learn more about \"Postpartum Care at Home With Your Baby: Care Instructions.\"  Current as of: July 10, 2023               Content " Version: 14.0    9035-6886 [employer].   Care instructions adapted under license by your healthcare professional. If you have questions about a medical condition or this instruction, always ask your healthcare professional. [employer] disclaims any warranty or liability for your use of this information.

## 2024-07-28 NOTE — PLAN OF CARE
"Pts vitals stable. Fundus firm and midline. Denies difficulty voiding. Denied need for pain medication but is aware it's available. Breastfeeding infant, pumping and supplementing with formula. Independent with self and infant cares. Plant to discharge home today.     Problem: Adult Inpatient Plan of Care  Goal: Plan of Care Review  Description: The Plan of Care Review/Shift note should be completed every shift.  The Outcome Evaluation is a brief statement about your assessment that the patient is improving, declining, or no change.  This information will be displayed automatically on your shift  note.  Outcome: Progressing  Flowsheets (Taken 7/28/2024 1452)  Outcome Evaluation: Pt meeting expected outcomes, stable for discharge.  Plan of Care Reviewed With: patient  Overall Patient Progress: improving  Goal: Patient-Specific Goal (Individualized)  Description: You can add care plan individualizations to a care plan. Examples of Individualization might be:  \"Parent requests to be called daily at 9am for status\", \"I have a hard time hearing out of my right ear\", or \"Do not touch me to wake me up as it startles  me\".  Outcome: Progressing  Goal: Absence of Hospital-Acquired Illness or Injury  Outcome: Progressing  Intervention: Prevent Skin Injury  Recent Flowsheet Documentation  Taken 7/28/2024 0804 by Catherine Cortes, RN  Body Position: position changed independently  Intervention: Prevent Infection  Recent Flowsheet Documentation  Taken 7/28/2024 0804 by Catherine Cortes, RN  Infection Prevention:   hand hygiene promoted   rest/sleep promoted  Goal: Optimal Comfort and Wellbeing  Outcome: Progressing  Intervention: Provide Person-Centered Care  Recent Flowsheet Documentation  Taken 7/28/2024 0804 by Catherine Cortes, RN  Trust Relationship/Rapport:   care explained   choices provided   questions encouraged   questions answered   thoughts/feelings acknowledged  Goal: Readiness for Transition of Care  Outcome: " Progressing       Goal Outcome Evaluation:      Plan of Care Reviewed With: patient    Overall Patient Progress: improvingOverall Patient Progress: improving    Outcome Evaluation: Pt meeting expected outcomes, stable for discharge.

## 2024-07-28 NOTE — LACTATION NOTE
Lactation in to visit with patient. Assisted with two feeds. First feed baby unsettled at breast. Attempted in cross cradle with no sustained latched. Baby on and off of breast. Shield given as baby has good suck rhythm on gloved finger. Switched to football hold. Baby suck inconsistent. Tube at breast with formula, baby able to take 12 mls. Second feed together baby more consistent with no swallows heard. Parents stated they will do bottle at home. Plan to breastfeed first, will attempt without shield, if not latch slip shield on, supplement with formula and pump with hand expressions. Will watch video. Nipples measured for home pump. Knows to continue to supplement until milk is in and baby doing more swallowing at breast. Many questions answered.

## 2024-07-28 NOTE — PLAN OF CARE
Vital signs stable with the exception of mildly elevated Bps 130-140s. See provider note. Postpartum checks WDL. Pt is up ad rebecca, voiding w/o difficulties. Pt is independent in self cares. Pt is Breast feeding every 2-3 hrs, tolerating well. Encouraged pt to hand express/pump after feeds. Pain well controlled with Ibuprofen and Tylenol. Pt stated increased comfort after each medication. Pt is attentive to all  cues and cares. Positive bonding observed. FOB at bedside, supportive of pt.            Goal Outcome Evaluation:      Plan of Care Reviewed With: patient, spouse    Overall Patient Progress: improvingOverall Patient Progress: improving      Problem: Adult Inpatient Plan of Care  Goal: Plan of Care Review  Description: The Plan of Care Review/Shift note should be completed every shift.  The Outcome Evaluation is a brief statement about your assessment that the patient is improving, declining, or no change.  This information will be displayed automatically on your shift  note.  Outcome: Progressing  Flowsheets (Taken 2024 0023)  Plan of Care Reviewed With:   patient   spouse  Overall Patient Progress: improving  Goal: Absence of Hospital-Acquired Illness or Injury  Intervention: Prevent Skin Injury  Recent Flowsheet Documentation  Taken 2024 by Helena Meyers, RN  Body Position: position changed independently  Taken 2024 by Helena Meyers, RN  Body Position: position changed independently  Intervention: Prevent Infection  Recent Flowsheet Documentation  Taken 2024 by Helena Meyers, RN  Infection Prevention:   hand hygiene promoted   rest/sleep promoted   single patient room provided  Taken 2024 by Helena Meyers, RN  Infection Prevention:   hand hygiene promoted   rest/sleep promoted   single patient room provided  Goal: Optimal Comfort and Wellbeing  Intervention: Provide Person-Centered Care  Recent Flowsheet Documentation  Taken 20245 by  Helena Meyers RN  Trust Relationship/Rapport:   care explained   choices provided   emotional support provided   empathic listening provided   questions answered   questions encouraged   reassurance provided   thoughts/feelings acknowledged  Taken 7/27/2024 2008 by Helena Meyers RN  Trust Relationship/Rapport:   care explained   choices provided   emotional support provided   empathic listening provided   questions answered   questions encouraged   reassurance provided   thoughts/feelings acknowledged     Problem: Labor  Goal: Stable Fetal Wellbeing  Intervention: Promote and Monitor Fetal Wellbeing  Recent Flowsheet Documentation  Taken 7/27/2024 2355 by Helena Meyers RN  Body Position: position changed independently  Taken 7/27/2024 2008 by Helena Meyers RN  Body Position: position changed independently  Goal: Absence of Infection Signs and Symptoms  Intervention: Prevent or Manage Infection  Recent Flowsheet Documentation  Taken 7/27/2024 2355 by Helena Meyers RN  Infection Prevention:   hand hygiene promoted   rest/sleep promoted   single patient room provided  Taken 7/27/2024 2008 by Helena Meyers RN  Infection Prevention:   hand hygiene promoted   rest/sleep promoted   single patient room provided     Problem: Postpartum (Vaginal Delivery)  Goal: Optimal Pain Control and Function  Intervention: Prevent or Manage Pain  Recent Flowsheet Documentation  Taken 7/27/2024 2355 by Helena Meyers, RN  Perineal Care:   perineal hygiene encouraged   perineal spray bottle/warm water use encouraged  Taken 7/27/2024 2008 by Helena Meyers RN  Perineal Care:   perineal hygiene encouraged   perineal spray bottle/warm water use encouraged

## 2024-07-28 NOTE — ANESTHESIA POSTPROCEDURE EVALUATION
Patient: Conchis Metzger    Procedure: * No procedures listed *       Anesthesia Type:  Epidural    Note:  Disposition: Inpatient   Postop Pain Control:    PONV:    Neuro/Psych:    Airway/Respiratory:    CV/Hemodynamics:    Other NRE:    DID A NON-ROUTINE EVENT OCCUR? No    Event details/Postop Comments:  I or my partner was immediately available for management of this patient during epidural analgesia infusion.   Patient post labor epidural catheter, doing well.  She reports good pain relief with epidural catheter.  She denies ongoing sensorimotor block, headache, fever, chills or other complaints.  All questions answered, understanding voiced.  She will have us contacted for any questions or problems.           Last vitals:  Vitals:    07/27/24 2355 07/28/24 0406 07/28/24 0804   BP: 136/84 123/73 (!) 140/82   Pulse: 73 60 72   Resp: 18 16 16   Temp: 97.9  F (36.6  C)  97.7  F (36.5  C)   SpO2:          Electronically Signed By: Les Fan MD  July 28, 2024  8:59 AM

## 2024-07-28 NOTE — PROVIDER NOTIFICATION
07/28/24 1503   Provider Notification   Provider Name/Title Dr Aldana   Method of Notification Electronic Page   Request Evaluate-Remote   Notification Reason Status Update     Vocera messaging with Dr Aldana    RN: Patient's BP has been stable and she's wondering about discharging.     MD: Ok to send her home. RN can place discharge order.    RN: Just to clarify that pt does not need a BP cuff for home, just a clinic visit in 1 week.    MD: Correct, only 1 week visit.

## 2024-07-28 NOTE — PROGRESS NOTES
"[facility] Postpartum Note    S:  Conchis Metzger feels  well this morning. Was able to sleep last night. Pain control adequate. Lochia minimal. Voiding. Breast feeding. Mood Good.     O:  Vitals were reviewed  Blood pressure 123/73, pulse 60, temperature 97.9  F (36.6  C), temperature source Oral, resp. rate 16, SpO2 97%, unknown if currently breastfeeding.    General: healthy, alert, and no distress  Abd: soft, appropriately tender, fundus firm  Legs: Non-tender, 0+ pitting edema    No results found for: \"RH\"  Rubella: immune    Assessment and Plan:   Postpartum Day #1, status post vaginal delivery, doing well.  -- Routine care  -- F/U 6 weeks w/ Primary OB  -- Discharge meds: see med rec  -- Contraception: considering    S/p Retained Placenta  -s/p Ancef for eugene sweeps  -QBL 350mL  -hb 12.7>350>11.8    Flavia Villegas MD    "

## 2024-07-28 NOTE — PROVIDER NOTIFICATION
07/27/24 2022   Provider Notification   Provider Name/Title Dr. Villegas   Method of Notification Phone   Request Evaluate-Remote   Notification Reason Vital Signs Change     Paged MD to update that last two Bps were 140s/80s. Pt denying all pre-e s/s. No history of elevated Bps. MD wants to keep pt q4hr Bps overnight. Will evaluate in AM.